# Patient Record
Sex: FEMALE | Race: WHITE | Employment: FULL TIME | URBAN - METROPOLITAN AREA
[De-identification: names, ages, dates, MRNs, and addresses within clinical notes are randomized per-mention and may not be internally consistent; named-entity substitution may affect disease eponyms.]

---

## 2018-01-29 ENCOUNTER — HOSPITAL ENCOUNTER (EMERGENCY)
Age: 63
Discharge: HOME | End: 2018-01-29
Attending: EMERGENCY MEDICINE | Admitting: EMERGENCY MEDICINE
Payer: COMMERCIAL

## 2018-01-29 ENCOUNTER — APPOINTMENT (OUTPATIENT)
Dept: GENERAL RADIOLOGY | Age: 63
End: 2018-01-29
Attending: EMERGENCY MEDICINE
Payer: COMMERCIAL

## 2018-01-29 VITALS
SYSTOLIC BLOOD PRESSURE: 118 MMHG | HEART RATE: 62 BPM | DIASTOLIC BLOOD PRESSURE: 58 MMHG | OXYGEN SATURATION: 95 % | TEMPERATURE: 97.8 F | RESPIRATION RATE: 16 BRPM

## 2018-01-29 DIAGNOSIS — R55 NEAR SYNCOPE: ICD-10-CM

## 2018-01-29 DIAGNOSIS — S82.891A CLOSED FRACTURE OF RIGHT ANKLE, INITIAL ENCOUNTER: Primary | ICD-10-CM

## 2018-01-29 LAB
ALBUMIN SERPL-MCNC: 3.8 G/DL (ref 3.5–5)
ALBUMIN/GLOB SERPL: 1 {RATIO} (ref 1.1–2.2)
ALP SERPL-CCNC: 92 U/L (ref 45–117)
ALT SERPL-CCNC: 34 U/L (ref 12–78)
ANION GAP SERPL CALC-SCNC: 7 MMOL/L (ref 5–15)
APTT PPP: 26.9 SEC (ref 22.1–32.5)
AST SERPL-CCNC: 20 U/L (ref 15–37)
BASOPHILS # BLD: 0.1 K/UL (ref 0–0.1)
BASOPHILS NFR BLD: 1 % (ref 0–1)
BILIRUB SERPL-MCNC: 0.4 MG/DL (ref 0.2–1)
BNP SERPL-MCNC: 206 PG/ML (ref 0–125)
BUN SERPL-MCNC: 13 MG/DL (ref 6–20)
BUN/CREAT SERPL: 15 (ref 12–20)
CALCIUM SERPL-MCNC: 9 MG/DL (ref 8.5–10.1)
CHLORIDE SERPL-SCNC: 104 MMOL/L (ref 97–108)
CK MB CFR SERPL CALC: 1.3 % (ref 0–2.5)
CK MB SERPL-MCNC: 2.6 NG/ML (ref 5–25)
CK SERPL-CCNC: 204 U/L (ref 26–192)
CO2 SERPL-SCNC: 28 MMOL/L (ref 21–32)
CREAT SERPL-MCNC: 0.84 MG/DL (ref 0.55–1.02)
DIFFERENTIAL METHOD BLD: NORMAL
EOSINOPHIL # BLD: 0.4 K/UL (ref 0–0.4)
EOSINOPHIL NFR BLD: 4 % (ref 0–7)
ERYTHROCYTE [DISTWIDTH] IN BLOOD BY AUTOMATED COUNT: 13.4 % (ref 11.5–14.5)
GLOBULIN SER CALC-MCNC: 3.7 G/DL (ref 2–4)
GLUCOSE SERPL-MCNC: 91 MG/DL (ref 65–100)
HCT VFR BLD AUTO: 36.3 % (ref 35–47)
HGB BLD-MCNC: 12.1 G/DL (ref 11.5–16)
IMM GRANULOCYTES # BLD: 0 K/UL (ref 0–0.04)
IMM GRANULOCYTES NFR BLD AUTO: 0 % (ref 0–0.5)
INR PPP: 1 (ref 0.9–1.1)
LYMPHOCYTES # BLD: 1.7 K/UL (ref 0.8–3.5)
LYMPHOCYTES NFR BLD: 17 % (ref 12–49)
MCH RBC QN AUTO: 30.3 PG (ref 26–34)
MCHC RBC AUTO-ENTMCNC: 33.3 G/DL (ref 30–36.5)
MCV RBC AUTO: 90.8 FL (ref 80–99)
MONOCYTES # BLD: 0.7 K/UL (ref 0–1)
MONOCYTES NFR BLD: 7 % (ref 5–13)
NEUTS SEG # BLD: 6.9 K/UL (ref 1.8–8)
NEUTS SEG NFR BLD: 70 % (ref 32–75)
NRBC # BLD: 0 K/UL (ref 0–0.01)
NRBC BLD-RTO: 0 PER 100 WBC
PLATELET # BLD AUTO: 259 K/UL (ref 150–400)
PMV BLD AUTO: 9.8 FL (ref 8.9–12.9)
POTASSIUM SERPL-SCNC: 3.7 MMOL/L (ref 3.5–5.1)
PROT SERPL-MCNC: 7.5 G/DL (ref 6.4–8.2)
PROTHROMBIN TIME: 10.4 SEC (ref 9–11.1)
RBC # BLD AUTO: 4 M/UL (ref 3.8–5.2)
SODIUM SERPL-SCNC: 139 MMOL/L (ref 136–145)
THERAPEUTIC RANGE,PTTT: NORMAL SECS (ref 58–77)
TROPONIN I SERPL-MCNC: <0.04 NG/ML
WBC # BLD AUTO: 9.9 K/UL (ref 3.6–11)

## 2018-01-29 PROCEDURE — 85730 THROMBOPLASTIN TIME PARTIAL: CPT | Performed by: EMERGENCY MEDICINE

## 2018-01-29 PROCEDURE — 83880 ASSAY OF NATRIURETIC PEPTIDE: CPT | Performed by: EMERGENCY MEDICINE

## 2018-01-29 PROCEDURE — 80053 COMPREHEN METABOLIC PANEL: CPT | Performed by: EMERGENCY MEDICINE

## 2018-01-29 PROCEDURE — 99285 EMERGENCY DEPT VISIT HI MDM: CPT

## 2018-01-29 PROCEDURE — 82553 CREATINE MB FRACTION: CPT | Performed by: EMERGENCY MEDICINE

## 2018-01-29 PROCEDURE — 75810000053 HC SPLINT APPLICATION

## 2018-01-29 PROCEDURE — 74011250636 HC RX REV CODE- 250/636: Performed by: EMERGENCY MEDICINE

## 2018-01-29 PROCEDURE — 85025 COMPLETE CBC W/AUTO DIFF WBC: CPT | Performed by: EMERGENCY MEDICINE

## 2018-01-29 PROCEDURE — 71045 X-RAY EXAM CHEST 1 VIEW: CPT

## 2018-01-29 PROCEDURE — 96360 HYDRATION IV INFUSION INIT: CPT

## 2018-01-29 PROCEDURE — 36415 COLL VENOUS BLD VENIPUNCTURE: CPT | Performed by: EMERGENCY MEDICINE

## 2018-01-29 PROCEDURE — 93005 ELECTROCARDIOGRAM TRACING: CPT

## 2018-01-29 PROCEDURE — 84484 ASSAY OF TROPONIN QUANT: CPT | Performed by: EMERGENCY MEDICINE

## 2018-01-29 PROCEDURE — 85610 PROTHROMBIN TIME: CPT | Performed by: EMERGENCY MEDICINE

## 2018-01-29 PROCEDURE — 82550 ASSAY OF CK (CPK): CPT | Performed by: EMERGENCY MEDICINE

## 2018-01-29 PROCEDURE — 73600 X-RAY EXAM OF ANKLE: CPT

## 2018-01-29 RX ORDER — TRAMADOL HYDROCHLORIDE 50 MG/1
50 TABLET ORAL
Qty: 12 TAB | Refills: 0 | Status: SHIPPED | OUTPATIENT
Start: 2018-01-29 | End: 2018-11-14

## 2018-01-29 RX ADMIN — SODIUM CHLORIDE 1000 ML: 900 INJECTION, SOLUTION INTRAVENOUS at 19:52

## 2018-01-30 LAB
ATRIAL RATE: 61 BPM
CALCULATED P AXIS, ECG09: 61 DEGREES
CALCULATED R AXIS, ECG10: 26 DEGREES
CALCULATED T AXIS, ECG11: 19 DEGREES
DIAGNOSIS, 93000: NORMAL
P-R INTERVAL, ECG05: 188 MS
Q-T INTERVAL, ECG07: 466 MS
QRS DURATION, ECG06: 86 MS
QTC CALCULATION (BEZET), ECG08: 469 MS
VENTRICULAR RATE, ECG03: 61 BPM

## 2018-01-30 NOTE — ED TRIAGE NOTES
TRIAGE NOTE:  Patient arrives by EMS with c/o dizziness. Patient reports \"fainted for a few seconds\". Per EMS /48 and BG 75.  Patient also reports dizziness with standing. Patient reports pain to right ankle.

## 2018-01-30 NOTE — DISCHARGE INSTRUCTIONS
Broken Ankle: Care Instructions  Your Care Instructions    An ankle may break (fracture) during sports, a fall, or other accidents. Fractures can range from a small, hairline crack, to a bone or bones broken into two or more pieces. Your treatment depends on how bad the break is. Your doctor may have put your ankle in a splint or cast to allow it to heal or to keep it stable until you see another doctor. It may take weeks or months for your ankle to heal. You can help your ankle heal with some care at home. You heal best when you take good care of yourself. Eat a variety of healthy foods, and don't smoke. You may have had a sedative to help you relax. You may be unsteady after having sedation. It can take a few hours for the medicine's effects to wear off. Common side effects of sedation include nausea, vomiting, and feeling sleepy or tired. The doctor has checked you carefully, but problems can develop later. If you notice any problems or new symptoms,  get medical treatment right away. Follow-up care is a key part of your treatment and safety. Be sure to make and go to all appointments, and call your doctor if you are having problems. It's also a good idea to know your test results and keep a list of the medicines you take. How can you care for yourself at home? · If the doctor gave you a sedative:  ¨ For 24 hours, don't do anything that requires attention to detail. It takes time for the medicine's effects to completely wear off. ¨ For your safety, do not drive or operate any machinery that could be dangerous. Wait until the medicine wears off and you can think clearly and react easily. · Put ice or a cold pack on your ankle for 10 to 20 minutes at a time. Try to do this every 1 to 2 hours for the next 3 days (when you are awake). Put a thin cloth between the ice and your cast or splint. Keep your cast or splint dry. · Follow the cast care instructions your doctor gives you.  If you have a splint, do not take it off unless your doctor tells you to. · Be safe with medicines. Take pain medicines exactly as directed. ¨ If the doctor gave you a prescription medicine for pain, take it as prescribed. ¨ If you are not taking a prescription pain medicine, ask your doctor if you can take an over-the-counter medicine. · Prop up your leg on pillows in the first few days after the injury. Keep the ankle higher than the level of your heart. This will help reduce swelling. · Do not put weight on your ankle unless your doctor tells you to. Use crutches to walk. · Follow instructions for exercises to keep your leg strong. · Wiggle your toes often to reduce swelling and stiffness. When should you call for help? Call 911 anytime you think you may need emergency care. For example, call if:  ? · You have chest pain, are short of breath, or you cough up blood. ? · You are very sleepy and you have trouble waking up. ?Call your doctor now or seek immediate medical care if:  ? · You have new or worse nausea or vomiting. ? · You have new or worse pain. ? · Your foot is cool or pale or changes color. ? · You have tingling, weakness, or numbness in your toes. ? · Your cast or splint feels too tight. ? · You have signs of a blood clot in your leg (called a deep vein thrombosis), such as:  ¨ Pain in your calf, back of the knee, thigh, or groin. ¨ Redness or swelling in your leg. ? Watch closely for changes in your health, and be sure to contact your doctor if:  ? · You have a problem with your splint or cast.   ? · You do not get better as expected. Where can you learn more? Go to http://wing-gabe.info/. Enter P763 in the search box to learn more about \"Broken Ankle: Care Instructions. \"  Current as of: March 21, 2017  Content Version: 11.4  © 6437-6404 [employer].  Care instructions adapted under license by [employer] (which disclaims liability or warranty for this information). If you have questions about a medical condition or this instruction, always ask your healthcare professional. [address] any warranty or liability for your use of this information. We hope that we have addressed all of your medical concerns. The examination and treatment you received in the Emergency Department were for an emergent problem and were not intended as complete care. It is important that you follow up with your healthcare provider(s) for ongoing care. If your symptoms worsen or do not improve as expected, and you are unable to reach your usual health care provider(s), you should return to the Emergency Department. Today's healthcare is undergoing tremendous change, and patient satisfaction surveys are one of the many tools to assess the quality of medical care. You may receive a survey from the [employer] organization regarding your experience in the Emergency Department. I hope that your experience has been completely positive, particularly the medical care that I provided. As such, please participate in the survey; anything less than excellent does not meet my expectations or intentions. [address] [facility] and [facility] participate in nationally recognized quality of care measures. If your blood pressure is greater than 120/80, as reported below, we urge that you seek medical care to address the potential of high blood pressure, commonly known as hypertension. Hypertension can be hereditary or can be caused by certain medical conditions, pain, stress, or \"white coat syndrome. \"       Please make an appointment with your health care provider(s) for follow up of your Emergency Department visit.        VITALS:   Patient Vitals for the past 8 hrs:   Temp Pulse Resp BP SpO2   01/29/18 1945 - 61 - 98/53 98 %   01/29/18 1903 97.8 °F (36.6 °C) 60 16 110/58 96 %          Thank you for allowing us to provide you with medical care today. We realize that you have many choices for your emergency care needs. Please choose us in the future for any continued health care needs. Margaret Villanueva MD    [facility].   Office: [phone]            Recent Results (from the past 24 hour(s))   EKG, 12 LEAD, INITIAL    Collection Time: 01/29/18  7:06 PM   Result Value Ref Range    Ventricular Rate 61 BPM    Atrial Rate 61 BPM    P-R Interval 188 ms    QRS Duration 86 ms    Q-T Interval 466 ms    QTC Calculation (Bezet) 469 ms    Calculated P Axis 61 degrees    Calculated R Axis 26 degrees    Calculated T Axis 19 degrees    Diagnosis       Normal sinus rhythm  When compared with ECG of 07-OCT-2015 10:09,  No significant change was found     CBC WITH AUTOMATED DIFF    Collection Time: 01/29/18  7:48 PM   Result Value Ref Range    WBC 9.9 3.6 - 11.0 K/uL    RBC 4.00 3.80 - 5.20 M/uL    HGB 12.1 11.5 - 16.0 g/dL    HCT 36.3 35.0 - 47.0 %    MCV 90.8 80.0 - 99.0 FL    MCH 30.3 26.0 - 34.0 PG    MCHC 33.3 30.0 - 36.5 g/dL    RDW 13.4 11.5 - 14.5 %    PLATELET 169 970 - 648 K/uL    MPV 9.8 8.9 - 12.9 FL    NRBC 0.0 0  WBC    ABSOLUTE NRBC 0.00 0.00 - 0.01 K/uL    NEUTROPHILS 70 32 - 75 %    LYMPHOCYTES 17 12 - 49 %    MONOCYTES 7 5 - 13 %    EOSINOPHILS 4 0 - 7 %    BASOPHILS 1 0 - 1 %    IMMATURE GRANULOCYTES 0 0.0 - 0.5 %    ABS. NEUTROPHILS 6.9 1.8 - 8.0 K/UL    ABS. LYMPHOCYTES 1.7 0.8 - 3.5 K/UL    ABS. MONOCYTES 0.7 0.0 - 1.0 K/UL    ABS. EOSINOPHILS 0.4 0.0 - 0.4 K/UL    ABS. BASOPHILS 0.1 0.0 - 0.1 K/UL    ABS. IMM.  GRANS. 0.0 0.00 - 0.04 K/UL    DF AUTOMATED     METABOLIC PANEL, COMPREHENSIVE    Collection Time: 01/29/18  7:48 PM   Result Value Ref Range    Sodium 139 136 - 145 mmol/L    Potassium 3.7 3.5 - 5.1 mmol/L    Chloride 104 97 - 108 mmol/L    CO2 28 21 - 32 mmol/L    Anion gap 7 5 - 15 mmol/L    Glucose 91 65 - 100 mg/dL    BUN 13 6 - 20 MG/DL    Creatinine 0.84 0.55 - 1.02 MG/DL    BUN/Creatinine ratio 15 12 - 20      GFR est AA >60 >60 ml/min/1.73m2    GFR est non-AA >60 >60 ml/min/1.73m2    Calcium 9.0 8.5 - 10.1 MG/DL    Bilirubin, total 0.4 0.2 - 1.0 MG/DL    ALT (SGPT) 34 12 - 78 U/L    AST (SGOT) 20 15 - 37 U/L    Alk. phosphatase 92 45 - 117 U/L    Protein, total 7.5 6.4 - 8.2 g/dL    Albumin 3.8 3.5 - 5.0 g/dL    Globulin 3.7 2.0 - 4.0 g/dL    A-G Ratio 1.0 (L) 1.1 - 2.2     TROPONIN I    Collection Time: 01/29/18  7:48 PM   Result Value Ref Range    Troponin-I, Qt. <0.04 <0.05 ng/mL   CK W/ REFLX CKMB    Collection Time: 01/29/18  7:48 PM   Result Value Ref Range     (H) 26 - 192 U/L   PTT    Collection Time: 01/29/18  7:48 PM   Result Value Ref Range    aPTT 26.9 22.1 - 32.5 sec    aPTT, therapeutic range     58.0 - 77.0 SECS   PROTHROMBIN TIME + INR    Collection Time: 01/29/18  7:48 PM   Result Value Ref Range    INR 1.0 0.9 - 1.1      Prothrombin time 10.4 9.0 - 11.1 sec   NT-PRO BNP    Collection Time: 01/29/18  7:48 PM   Result Value Ref Range    NT pro- (H) 0 - 125 PG/ML   CK-MB,QUANT. Collection Time: 01/29/18  7:48 PM   Result Value Ref Range    CK - MB 2.6 <3.6 NG/ML    CK-MB Index 1.3 0 - 2.5         Xr Ankle Rt Ap/lat    Result Date: 1/29/2018  EXAM:  XR ANKLE RT AP/LAT INDICATION:  Trauma. COMPARISON: None. FINDINGS: Two views of the right ankle demonstrate an acute spiral fracture of the distal fibula at the lateral malleolus without displacement or angulation. There is soft tissue swelling laterally. There is no other fracture identified and the ankle mortise is preserved. There is no other acute osseous or articular abnormality. The soft tissues are otherwise within normal limits. There is mild midfoot degenerative change and calcaneal spurring noted. IMPRESSION: Acute nondisplaced and nonangulated spiral fracture of the lateral malleolus with overlying soft tissue swelling.      Xr Chest Port    Result Date: 1/29/2018  EXAM: XR CHEST PORT INDICATION:  Dizziness with standing and single episode of syncope] arrival. COMPARISON:  None. FINDINGS: A portable AP radiograph of the chest was obtained at 20:18 hours. The patient is on a cardiac monitor. The lungs are clear. The cardiac and mediastinal contours and pulmonary vascularity are normal.  The chest wall structures and visualized upper abdomen show no acute findings with incidental note of degenerative spine and shoulder changes. IMPRESSION: No acute findings.

## 2018-01-30 NOTE — ED PROVIDER NOTES
HPI Comments: 57 y.o. female with past medical history significant for Obesity, Raynaud's Disease, GERD, and Arthritis who presents from Home via EMS with chief complaint of Evaluation after Syncopal Episode. Patient reports sudden onset of lightheadedness and loss of hearing,  prior to having a syncopal episode. Pt's family member reports the patient was lowered to the ground and became unresponsive for \"about three seconds\". Patient's family reports patient then had a second syncopal episode lasting \"for about a second\" prior to EMS arrival. Per EMS patient's BP was 100/48 and BG 75. Patient [facility] to [facility] ED alert and orientated. Pt reports onset of right ankle pain, upon being lowered to the ground. Pt states constant right ankle pain with radiation to her right foot described as \"sore\". Pt reports aggravation with palpation and with ROM of her right ankle. Pt denies weight bearing on right foot since onset of symptoms. Pt denies previous history of syncopal episodes similar to those presented today. Pt reports intermittent diarrhea at baseline. Pt denies ETOH use prior to arrival. Pt denies fever, chills, cough, congestion, shortness of breath, chest pain, abdominal pain, nausea, vomiting, difficulty with urination or dysuria. There are no other acute medical concerns at this time. PCP: Hawk Huntley MD    Note written by Chelsea Armendariz, as dictated by Marvin Valentine MD 7:28 PM      The history is provided by the patient.         Past Medical History:   Diagnosis Date    Allergic rhinitis 12/7/2009    Arthritis     Back pain 12/7/2009    Depression 12/7/2009    GERD (gastroesophageal reflux disease)     Hypercholesterolemia     Hyperlipidemia 12/7/2009    Hypothyroidism 12/7/2009    Obesity 12/7/2009    Raynaud disease        Past Surgical History:   Procedure Laterality Date    BREAST SURGERY PROCEDURE UNLISTED  2-2003    reduction    ENDOSCOPY, COLON, DIAGNOSTIC Family History:   Problem Relation Age of Onset   Randall Gallagher Cancer Mother      breast, rectal    Heart Disease Father     Asthma Father     Anesth Problems Neg Hx        Social History     Social History    Marital status:      Spouse name: N/A    Number of children: N/A    Years of education: N/A     Occupational History    Not on file. Social History Main Topics    Smoking status: Former Smoker     Packs/day: 10.00     Quit date: 10/7/1995    Smokeless tobacco: Never Used    Alcohol use 1.0 oz/week     1 Glasses of wine, 1 Shots of liquor per week      Comment: 2 DRINKS PER WEEK    Drug use: No    Sexual activity: Yes     Partners: Male     Birth control/ protection: None     Other Topics Concern    Not on file     Social History Narrative         ALLERGIES: Celebrex [celecoxib]; Codeine; and Lipitor [atorvastatin]    Review of Systems   Constitutional: Negative for chills and fever. HENT: Negative for congestion. Respiratory: Negative for cough and shortness of breath. Cardiovascular: Negative for chest pain. Gastrointestinal: Negative for abdominal pain, diarrhea, nausea and vomiting. Genitourinary: Negative for difficulty urinating and dysuria. Musculoskeletal: Positive for arthralgias. Neurological: Positive for syncope and light-headedness. All other systems reviewed and are negative. Vitals:    01/29/18 1903 01/29/18 1945   BP: 110/58 98/53   Pulse: 60 61   Resp: 16    Temp: 97.8 °F (36.6 °C)    SpO2: 96% 98%            Physical Exam   Constitutional: She is oriented to person, place, and time. She appears well-developed and well-nourished. HENT:   Head: Normocephalic and atraumatic. Nose: Nose normal.   Eyes: Conjunctivae and EOM are normal. Pupils are equal, round, and reactive to light. Neck: Normal range of motion. Neck supple. Cardiovascular: Normal rate, regular rhythm, normal heart sounds and intact distal pulses.     Pulmonary/Chest: Effort normal and breath sounds normal.   Abdominal: Soft. Bowel sounds are normal.   Musculoskeletal: Normal range of motion. She exhibits no deformity. Contusion and Mild swelling of right ankle   Neurological: She is oriented to person, place, and time. She has normal reflexes. Skin: Skin is warm and dry. Psychiatric: She has a normal mood and affect. Her behavior is normal.   Nursing note and vitals reviewed. Note written by Chelsea Castro, as dictated by Edie Esparza MD 7:28 PM    [facility]  ED Course       Procedures    ED EKG interpretation:  Rhythm: normal sinus rhythm; and regular . Rate (approx.): 61bpm; Axis: normal; ST/T wave: normal;   Note written by Chelsea Castro, as dictated by Edie Esparza MD 7:36 PM       PROGRESS NOTE:9:00 PM  Provider reviewed Xray images    PROGRESS NOTE:9:08 PM  Provider discussed imaging results, which showed acute nondisplaced and nonangulated spiral fracture of the lateral malleolus with overlying soft tissue swelling.     Will splint ankle and discharge patient with Ortho follow up

## 2018-11-14 ENCOUNTER — HOSPITAL ENCOUNTER (OUTPATIENT)
Dept: PREADMISSION TESTING | Age: 63
Discharge: HOME | End: 2018-11-14
Payer: COMMERCIAL

## 2018-11-14 VITALS
SYSTOLIC BLOOD PRESSURE: 131 MMHG | TEMPERATURE: 97.9 F | HEIGHT: 64 IN | DIASTOLIC BLOOD PRESSURE: 75 MMHG | WEIGHT: 189 LBS | RESPIRATION RATE: 16 BRPM | HEART RATE: 57 BPM | BODY MASS INDEX: 32.27 KG/M2

## 2018-11-14 LAB
ABO + RH BLD: NORMAL
BLOOD GROUP ANTIBODIES SERPL: NORMAL
SPECIMEN EXP DATE BLD: NORMAL

## 2018-11-14 PROCEDURE — 86901 BLOOD TYPING SEROLOGIC RH(D): CPT

## 2018-11-14 PROCEDURE — 36415 COLL VENOUS BLD VENIPUNCTURE: CPT

## 2018-11-14 RX ORDER — DICYCLOMINE HYDROCHLORIDE 10 MG/1
10 CAPSULE ORAL 3 TIMES DAILY
COMMUNITY

## 2018-11-14 RX ORDER — DICLOFENAC SODIUM 75 MG/1
75 TABLET, DELAYED RELEASE ORAL 2 TIMES DAILY
COMMUNITY
End: 2018-11-30

## 2018-11-14 RX ORDER — CITALOPRAM 20 MG/1
20 TABLET, FILM COATED ORAL DAILY
COMMUNITY

## 2018-11-14 NOTE — PERIOP NOTES
Patient adamantly states she took a list of ordered labs and EKG to the [facility] in [place] on 11/3/18, blood was drawn and EKG performed. States these results were faxed to Dr Cesar Jesus office. T&S and MRSA culture done today. Pt will follow up to make sure all labs and EKG were received. DO NOT EAT ANYTHING AFTER MIDNIGHT, except as instructed THE NIGHT BEFORE SURGERY. PT GIVEN OPPORTUNITY TO ASK ADDITIONAL QUESTIONS. PRE-OPERATIVE INSTRUCTIONS REVIEWED WITH PATIENT. PATIENT GIVEN 6-PACK OF CHG WIPES. INSTRUCTIONS REVIEWED ON USE OF CHG WIPES. PATIENT GIVEN SSI INFECTION FAQ SHEET. MRSA / MSSA TREATMENT INSTRUCTION SHEET GIVEN WITH AN EXPLANATION TO PATIENT THAT THEY WILL BE NOTIFIED IF TREATMENT INSTRUCTIONS NEED TO BE INITIATED. PATIENT WAS GIVEN THE OPPORTUNITY TO ASK QUESTIONS ON THE INFORMATION PROVIDED. 
 
1408: Dr Cesar Jesus office called for results to be faxed to PAT.

## 2018-11-14 NOTE — PROGRESS NOTES
Problem: Patient Education: Go to Patient Education Activity Goal: Patient/Family Education Outcome: Progressing Towards Goal 
Attended pre-op spine class with . Questions, concerns, and comments were addressed.

## 2018-11-15 LAB
BACTERIA SPEC CULT: ABNORMAL
BACTERIA SPEC CULT: ABNORMAL
SERVICE CMNT-IMP: ABNORMAL

## 2018-11-16 NOTE — PERIOP NOTES
A MESSAGE WAS LEFT C KENZIE BELTRAN'S NURSE INFORMING HER THAT THE PT IS MRSA + AND THAT ANAM NP WILL TREAT THE PT.

## 2018-11-19 PROBLEM — Z22.322 MRSA CARRIER: Status: ACTIVE | Noted: 2018-11-19

## 2018-11-19 RX ORDER — CHLORHEXIDINE GLUCONATE 4 G/100ML
60 SOLUTION TOPICAL DAILY
Qty: 420 ML | Refills: 0 | Status: SHIPPED | OUTPATIENT
Start: 2018-11-19 | End: 2018-11-30

## 2018-11-19 RX ORDER — MUPIROCIN 20 MG/G
OINTMENT TOPICAL 2 TIMES DAILY
Qty: 22 G | Refills: 0 | Status: SHIPPED | OUTPATIENT
Start: 2018-11-20 | End: 2018-11-30

## 2018-11-20 NOTE — H&P
[facility] Location: [place] [address] [address] Patient #: [id number] : 1955  / Language: [place] / Amber Antu: Coughlin Edgar Female History of Present Illness The patient is a 57year old female who presents for a Recheck of Acute lumbar back pain. The last clinic visit was 4 month(s) ago. Management changes made at the last visit include ordering test(s) (SHAWN). Symptoms include pain, decreased range of motion and numbness. The pain radiates to the left thigh and right lower leg. Onset was sudden 2 month(s) ago. The symptoms occur constantly. The patient describes this pain as moderate in severity and worsening. The patient was previously evaluated in this clinic 4 month(s) ago. Past evaluation has included x-ray of the lumbar spine and MRI of the lumbar spine. Past treatment has included back surgery. Problem List/Past Medical 
STENOSIS (724.02)   
SPRAIN/STRAIN (847.2)   
CERVICALGIA (723.1)   
SPONDYLOLISTHESIS, ACQ. (738.4)   
SCIATICA (724.3) (724.3  M54.30)   
REVIEW OF SYSTEMS: Systems were reviewed by the provider.   
Chronic low back pain (724.2  M54.5)   
Patient was referred to their primary care physician for Blood Pressure screening.   
Closed nondisplaced fracture of lateral malleolus of right fibula with routine healing, subsequent encounter (V54.19  S82.64XD)   
Weight above 97th percentile (V49.89  Z78.9)   
Nondisplaced fracture of lateral malleolus of right fibula, initial encounter for closed fracture (824.2  S82.64XA)   
FOLLOW-UP AFTER SURGERY (V67.00)   
DDD (722.52)   
S/P lumbar fusion (V45.4  Z98.1)   Allergies No Known Drug Allergies Family History Heart Disease   
Osteoporosis   
Arthritis   
Diabetes Mellitus   
Breast cancer   
Asthma   
Anemia   
 
Social History Tobacco / smoke exposure  : No 
HIV risk factors   : no Tobacco use   Former smoker. Caffeine use  : Drinks coffee, tea and soft drinks 3-4 times a day. Sun Exposure  : frequently Seat Belt Use  2: always Exercise : Bicycles and walks occasionally. Alcohol use : Drinks beer, wine and liquor 5 times per month having 1-2 drinks per occasion, rarely having more than 5 drinks per occasion. Illicit drug use  : none Medication History Medications Reconciled  
 
Past Surgical History Mammoplasty; Reduction   bilateral 
 
Diagnostic Studies History Lumbar   Date: 11/3/2015, Results: AP and lateral films of the lumbar spine reveal a stable posterior lumbar fusion at L4-5. Instrumentation appears intact and without hardware difficulty. MRI, Spine   Date: 2/4/2015, Results: Review of the MRI demonstrates severe stenosis at L4-5 due to the anterolisthesis. Is also moderate stenosis at L5-S1 due to disc space collapse and foraminal narrowing. There are no subluxations fractures or lytic lesions. Lumbar Spine X-ray   Date: 1/12/2016, Results: AP and lateral x-rays of the lumbar spine shows stable fusion at L4-5. Instrumentation is in good position. Good graft incorporation is noted. Lumbar Spine X Ray   Date: 4/13/2016, Results:AP and lateral films of the lumbar spine reveals a stable posterior lumbar fusion with interbody fusion at L4-5. Instrumentation appears intact and without hardware difficulty. Lumbar Spine   Date: 12/9/2015, Results: AP and lateral films of the lumbar spine reveals a stable posterior lumbar fusion with interbody fusion at L4-5. Instrumentation appears intact and without hardware difficulty. Cervical Spine X-ray   Date: 7/31/2015, Results: Review of the cervical x-rays demonstrate multilevel cervical spondylosis. There are no fractures or lytic lesions. There are no subluxations. Lumbar Spine X-ray   Date: 10/19/2016, Results: AP and lateral xrays of the lumbar spine show a stable fusion at L4-5. instrumentation is in good position. good bone graft incorporation is noted.  
MRI Lumbar Spine   Date: 5/2/2018, Results: MRI shows a moderate spondylosis at L3-4. Stable decompression and fusion at L4-5. Discoloration at L3-4 causing severe foraminal stenosis bilaterally. Other Problems Hypercholesterolemia   
Arthritis   Thyroid Disease   
Unspecified Diagnosis   
Treatment options were discussed with the patient in full.   
 
 
Physical Exam 
Neurologic Sensory Light Touch - Intact - Globally. Overall Assessment of Muscle Strength and Tone reveals Lower Extremities - Right Iliopsoas - 5/5. Left Iliopsoas - 4-/5. Right Tibialis Anterior - 4/5. Left Tibialis Anterior - 5/5. Right Gastroc-Soleus - 5/5. Left Gastroc-Soleus - 5/5. Right EHL - 5/5. Left EHL - 5/5. General Assessment of Reflexes Right Ankle - Clonus is not present. Left Ankle - Clonus is not present. Reflexes (Dermatomes) 2/2 Normal - Left Achilles (L5-S2), Left Knee (L2-4), Right Achilles (L5-S2) and Right Knee (L2-4). Musculoskeletal 
Global Assessment Examination of related systems reveals - well-developed, well-nourished, in no acute distress, alert and oriented x 3. Gait and Station - normal gait and station and normal posture. Right Lower Extremity - normal strength and tone, normal range of motion without pain and no instability, subluxation or laxity. Left Lower Extremity - normal strength and tone, normal range of motion without pain and no instability, subluxation or laxity. Spine/Ribs/Pelvis Cervical Spine - Examination of the cervical spine reveals - no tenderness to palpation, no pain, no swelling, edema or erythema, normal cervical spine movements and normal sensation. Thoracic (Dorsal) Spine - Examination of the thoracic spine reveals - no tenderness over thoracic vertebrae, no pain, normal sensation and normal thoracic spine movements. Lumbosacral Spine - Examination of the lumbosacral spine reveals - no known fractures or deformities. Inspection and Palpation - Tenderness - moderate.  Assessment of pain reveals the following findings - The pain is characterized as - moderate. Location - pain refers to lower back bilaterally. ROJM - Trunk Extension - 15 degrees. Lumbar Spine Flexion - . Lumbosacral Spine - Functional Testing - Babinski Test negative, Prone Knee Bending Test negative, Slump Test negative, Straight Leg Raising Test negative. Assessment & PlanS/P lumbar fusion (V45.4  Z98.1) Impression: She continues to have pain from the junction stenosis at L3-4. She has severe stenosis there. She's had 3 injections without long-term relief. Ultimately I think she candidate for revision laminectomy at L3-4 she also needs an extension of fusion to L3. She has a mild flat back deformity with a pelvic incidence T lumbar lordosis mismatch. She may need a posterior osteotomy at L3-4 with a hyperlordotic graft to restore some lumbar lordosis. The risks and benefits were discussed at length with the patient and the patient has elected to proceed. Indications for surgery include failed conservative treatment. Alternative treatments, risks and the perioperative course were discussed with the patient. All questions were answered. The risks and benefits of the procedure were explained. Benefits include definitive diagnosis, relief of pain, elimination of deformity and improved function. Risks of surgery including bleeding, infection, weakness, numbness, CSF leak, failure to improve symptoms, exacerbation of medical co-morbidities and even death were discussed with the patient. SPONDYLOLISTHESIS, ACQ. (738.4  M43.10) Lumbar stenosis with neurogenic claudication (724.03  M48.062) Treatment options were discussed with the patient in full.(V65.49) Current Plans Pt Education - How to access health information online: discussed with patient and provided information. Presurgical planning was preformed with the patient today Surgery to be scheduled Procedure: Lumbar Laminectomy Revison L3-4 with L3-5 fusion Signed by Fabiola Quintanilla MD

## 2018-11-26 ENCOUNTER — ANESTHESIA EVENT (OUTPATIENT)
Dept: SURGERY | Age: 63
DRG: 455 | End: 2018-11-26
Payer: COMMERCIAL

## 2018-11-27 ENCOUNTER — HOSPITAL ENCOUNTER (INPATIENT)
Age: 63
LOS: 3 days | Discharge: HOME HEALTH | DRG: 455 | End: 2018-11-30
Attending: ORTHOPAEDIC SURGERY | Admitting: ORTHOPAEDIC SURGERY
Payer: COMMERCIAL

## 2018-11-27 ENCOUNTER — ANESTHESIA (OUTPATIENT)
Dept: SURGERY | Age: 63
DRG: 455 | End: 2018-11-27
Payer: COMMERCIAL

## 2018-11-27 ENCOUNTER — APPOINTMENT (OUTPATIENT)
Dept: GENERAL RADIOLOGY | Age: 63
DRG: 455 | End: 2018-11-27
Attending: ORTHOPAEDIC SURGERY
Payer: COMMERCIAL

## 2018-11-27 DIAGNOSIS — M48.062 SPINAL STENOSIS OF LUMBAR REGION WITH NEUROGENIC CLAUDICATION: Primary | ICD-10-CM

## 2018-11-27 PROBLEM — M48.061 SPINAL STENOSIS, LUMBAR: Status: ACTIVE | Noted: 2018-11-27

## 2018-11-27 PROCEDURE — C1713 ANCHOR/SCREW BN/BN,TIS/BN: HCPCS | Performed by: ORTHOPAEDIC SURGERY

## 2018-11-27 PROCEDURE — 77030014647 HC SEAL FBRN TISSL BAXT -D: Performed by: ORTHOPAEDIC SURGERY

## 2018-11-27 PROCEDURE — 65270000029 HC RM PRIVATE

## 2018-11-27 PROCEDURE — 77030004391 HC BUR FLUT MEDT -C: Performed by: ORTHOPAEDIC SURGERY

## 2018-11-27 PROCEDURE — 77030031139 HC SUT VCRL2 J&J -A: Performed by: ORTHOPAEDIC SURGERY

## 2018-11-27 PROCEDURE — 76060000037 HC ANESTHESIA 3 TO 3.5 HR: Performed by: ORTHOPAEDIC SURGERY

## 2018-11-27 PROCEDURE — 74011250636 HC RX REV CODE- 250/636

## 2018-11-27 PROCEDURE — 74011250636 HC RX REV CODE- 250/636: Performed by: ANESTHESIOLOGY

## 2018-11-27 PROCEDURE — 77030038600 HC TU BPLR IRR DISP STRY -B: Performed by: ORTHOPAEDIC SURGERY

## 2018-11-27 PROCEDURE — 77030013079 HC BLNKT BAIR HGGR 3M -A: Performed by: NURSE ANESTHETIST, CERTIFIED REGISTERED

## 2018-11-27 PROCEDURE — 76010000172 HC OR TIME 2.5 TO 3 HR INTENSV-TIER 1: Performed by: ORTHOPAEDIC SURGERY

## 2018-11-27 PROCEDURE — 77030012406 HC DRN WND PENRS BARD -A: Performed by: ORTHOPAEDIC SURGERY

## 2018-11-27 PROCEDURE — 77030008684 HC TU ET CUF COVD -B: Performed by: NURSE ANESTHETIST, CERTIFIED REGISTERED

## 2018-11-27 PROCEDURE — 77030022373 HC SPCR SPN STAXX SPWV -K1: Performed by: ORTHOPAEDIC SURGERY

## 2018-11-27 PROCEDURE — 74011250637 HC RX REV CODE- 250/637: Performed by: PHYSICIAN ASSISTANT

## 2018-11-27 PROCEDURE — 74011000250 HC RX REV CODE- 250

## 2018-11-27 PROCEDURE — 77030034850: Performed by: ORTHOPAEDIC SURGERY

## 2018-11-27 PROCEDURE — 8E0WXBF COMPUTER ASSISTED PROCEDURE OF TRUNK REGION, WITH FLUOROSCOPY: ICD-10-PCS | Performed by: ORTHOPAEDIC SURGERY

## 2018-11-27 PROCEDURE — 77030029099 HC BN WAX SSPC -A: Performed by: ORTHOPAEDIC SURGERY

## 2018-11-27 PROCEDURE — 74011000250 HC RX REV CODE- 250: Performed by: PHYSICIAN ASSISTANT

## 2018-11-27 PROCEDURE — 77030032490 HC SLV COMPR SCD KNE COVD -B: Performed by: ORTHOPAEDIC SURGERY

## 2018-11-27 PROCEDURE — 74011000258 HC RX REV CODE- 258

## 2018-11-27 PROCEDURE — 74011250636 HC RX REV CODE- 250/636: Performed by: ORTHOPAEDIC SURGERY

## 2018-11-27 PROCEDURE — 77030037713 HC CLOSR DEV INCIS ZIP STRY -B: Performed by: ORTHOPAEDIC SURGERY

## 2018-11-27 PROCEDURE — 76210000017 HC OR PH I REC 1.5 TO 2 HR: Performed by: ORTHOPAEDIC SURGERY

## 2018-11-27 PROCEDURE — 74011000250 HC RX REV CODE- 250: Performed by: ORTHOPAEDIC SURGERY

## 2018-11-27 PROCEDURE — 77030026438 HC STYL ET INTUB CARD -A: Performed by: NURSE ANESTHETIST, CERTIFIED REGISTERED

## 2018-11-27 PROCEDURE — 0SG1071 FUSION OF 2 OR MORE LUMBAR VERTEBRAL JOINTS WITH AUTOLOGOUS TISSUE SUBSTITUTE, POSTERIOR APPROACH, POSTERIOR COLUMN, OPEN APPROACH: ICD-10-PCS | Performed by: ORTHOPAEDIC SURGERY

## 2018-11-27 PROCEDURE — 77030034094 HC GRFT BN SUB ELITE TRNTY MUSC -I1: Performed by: ORTHOPAEDIC SURGERY

## 2018-11-27 PROCEDURE — 74011250636 HC RX REV CODE- 250/636: Performed by: PHYSICIAN ASSISTANT

## 2018-11-27 PROCEDURE — 0ST20ZZ RESECTION OF LUMBAR VERTEBRAL DISC, OPEN APPROACH: ICD-10-PCS | Performed by: ORTHOPAEDIC SURGERY

## 2018-11-27 PROCEDURE — 72100 X-RAY EXAM L-S SPINE 2/3 VWS: CPT

## 2018-11-27 PROCEDURE — 77030012893

## 2018-11-27 PROCEDURE — 76000 FLUOROSCOPY <1 HR PHYS/QHP: CPT

## 2018-11-27 PROCEDURE — 77030018836 HC SOL IRR NACL ICUM -A: Performed by: ORTHOPAEDIC SURGERY

## 2018-11-27 PROCEDURE — 0SG10AJ FUSION OF 2 OR MORE LUMBAR VERTEBRAL JOINTS WITH INTERBODY FUSION DEVICE, POSTERIOR APPROACH, ANTERIOR COLUMN, OPEN APPROACH: ICD-10-PCS | Performed by: ORTHOPAEDIC SURGERY

## 2018-11-27 PROCEDURE — 77030020782 HC GWN BAIR PAWS FLX 3M -B

## 2018-11-27 DEVICE — SET SCREW 5540030 5.5 TI NS BRK OFF
Type: IMPLANTABLE DEVICE | Site: SPINE LUMBAR | Status: FUNCTIONAL
Brand: CD HORIZON® SPINAL SYSTEM

## 2018-11-27 DEVICE — GRAFT BNE SUB L CANC FRZN MORSELIZED W/ VIABLE CELL TRINITY: Type: IMPLANTABLE DEVICE | Site: SPINE LUMBAR | Status: FUNCTIONAL

## 2018-11-27 DEVICE — GRAFT BNE SUB 7.5GM PTTY SYN SIGNAFUSE: Type: IMPLANTABLE DEVICE | Site: SPINE LUMBAR | Status: FUNCTIONAL

## 2018-11-27 DEVICE — CARTRIDGE LORDOTIC EXPANDABLE IMPLANT TI 25X9X8MM 15DEG
Type: IMPLANTABLE DEVICE | Site: SPINE LUMBAR | Status: FUNCTIONAL
Brand: STAXX® XD SYSTEM

## 2018-11-27 DEVICE — GRAFT BNE SUB M CANC FRZN MORSELIZED W/ VIABLE CELL TRINITY: Type: IMPLANTABLE DEVICE | Site: SPINE LUMBAR | Status: FUNCTIONAL

## 2018-11-27 RX ORDER — SUCCINYLCHOLINE CHLORIDE 20 MG/ML
INJECTION INTRAMUSCULAR; INTRAVENOUS AS NEEDED
Status: DISCONTINUED | OUTPATIENT
Start: 2018-11-27 | End: 2018-11-27 | Stop reason: HOSPADM

## 2018-11-27 RX ORDER — FENTANYL CITRATE 50 UG/ML
25 INJECTION, SOLUTION INTRAMUSCULAR; INTRAVENOUS
Status: COMPLETED | OUTPATIENT
Start: 2018-11-27 | End: 2018-11-27

## 2018-11-27 RX ORDER — MORPHINE SULFATE IN 0.9 % NACL 150MG/30ML
PATIENT CONTROLLED ANALGESIA SYRINGE INTRAVENOUS
Status: DISCONTINUED | OUTPATIENT
Start: 2018-11-27 | End: 2018-11-30 | Stop reason: HOSPADM

## 2018-11-27 RX ORDER — VANCOMYCIN HYDROCHLORIDE 1 G/20ML
INJECTION, POWDER, LYOPHILIZED, FOR SOLUTION INTRAVENOUS AS NEEDED
Status: DISCONTINUED | OUTPATIENT
Start: 2018-11-27 | End: 2018-11-27 | Stop reason: HOSPADM

## 2018-11-27 RX ORDER — OXYCODONE HYDROCHLORIDE 5 MG/1
5 TABLET ORAL
Status: DISCONTINUED | OUTPATIENT
Start: 2018-11-27 | End: 2018-11-30 | Stop reason: HOSPADM

## 2018-11-27 RX ORDER — SODIUM CHLORIDE 0.9 % (FLUSH) 0.9 %
5-10 SYRINGE (ML) INJECTION AS NEEDED
Status: DISCONTINUED | OUTPATIENT
Start: 2018-11-27 | End: 2018-11-27 | Stop reason: HOSPADM

## 2018-11-27 RX ORDER — CITALOPRAM 20 MG/1
20 TABLET, FILM COATED ORAL DAILY
Status: DISCONTINUED | OUTPATIENT
Start: 2018-11-28 | End: 2018-11-30 | Stop reason: HOSPADM

## 2018-11-27 RX ORDER — DEXAMETHASONE SODIUM PHOSPHATE 4 MG/ML
INJECTION, SOLUTION INTRA-ARTICULAR; INTRALESIONAL; INTRAMUSCULAR; INTRAVENOUS; SOFT TISSUE AS NEEDED
Status: DISCONTINUED | OUTPATIENT
Start: 2018-11-27 | End: 2018-11-27 | Stop reason: HOSPADM

## 2018-11-27 RX ORDER — HYDROMORPHONE HYDROCHLORIDE 2 MG/ML
INJECTION, SOLUTION INTRAMUSCULAR; INTRAVENOUS; SUBCUTANEOUS AS NEEDED
Status: DISCONTINUED | OUTPATIENT
Start: 2018-11-27 | End: 2018-11-27 | Stop reason: HOSPADM

## 2018-11-27 RX ORDER — DIPHENHYDRAMINE HCL 25 MG
25 CAPSULE ORAL
Status: DISCONTINUED | OUTPATIENT
Start: 2018-11-27 | End: 2018-11-30 | Stop reason: HOSPADM

## 2018-11-27 RX ORDER — MIDAZOLAM HYDROCHLORIDE 1 MG/ML
1 INJECTION, SOLUTION INTRAMUSCULAR; INTRAVENOUS AS NEEDED
Status: DISCONTINUED | OUTPATIENT
Start: 2018-11-27 | End: 2018-11-27 | Stop reason: HOSPADM

## 2018-11-27 RX ORDER — SODIUM CHLORIDE 9 MG/ML
125 INJECTION, SOLUTION INTRAVENOUS CONTINUOUS
Status: DISPENSED | OUTPATIENT
Start: 2018-11-27 | End: 2018-11-28

## 2018-11-27 RX ORDER — SODIUM CHLORIDE 0.9 % (FLUSH) 0.9 %
5-10 SYRINGE (ML) INJECTION AS NEEDED
Status: DISCONTINUED | OUTPATIENT
Start: 2018-11-27 | End: 2018-11-30 | Stop reason: HOSPADM

## 2018-11-27 RX ORDER — HYDROMORPHONE HYDROCHLORIDE 2 MG/ML
0.2 INJECTION, SOLUTION INTRAMUSCULAR; INTRAVENOUS; SUBCUTANEOUS
Status: DISCONTINUED | OUTPATIENT
Start: 2018-11-27 | End: 2018-11-27 | Stop reason: HOSPADM

## 2018-11-27 RX ORDER — FACIAL-BODY WIPES
10 EACH TOPICAL DAILY PRN
Status: DISCONTINUED | OUTPATIENT
Start: 2018-11-29 | End: 2018-11-30 | Stop reason: HOSPADM

## 2018-11-27 RX ORDER — ONDANSETRON 2 MG/ML
INJECTION INTRAMUSCULAR; INTRAVENOUS AS NEEDED
Status: DISCONTINUED | OUTPATIENT
Start: 2018-11-27 | End: 2018-11-27 | Stop reason: HOSPADM

## 2018-11-27 RX ORDER — NALOXONE HYDROCHLORIDE 0.4 MG/ML
0.4 INJECTION, SOLUTION INTRAMUSCULAR; INTRAVENOUS; SUBCUTANEOUS AS NEEDED
Status: DISCONTINUED | OUTPATIENT
Start: 2018-11-27 | End: 2018-11-30 | Stop reason: HOSPADM

## 2018-11-27 RX ORDER — OXYCODONE HYDROCHLORIDE 5 MG/1
10 TABLET ORAL
Status: DISCONTINUED | OUTPATIENT
Start: 2018-11-27 | End: 2018-11-30 | Stop reason: HOSPADM

## 2018-11-27 RX ORDER — MORPHINE SULFATE 2 MG/ML
2 INJECTION, SOLUTION INTRAMUSCULAR; INTRAVENOUS
Status: DISCONTINUED | OUTPATIENT
Start: 2018-11-27 | End: 2018-11-30 | Stop reason: HOSPADM

## 2018-11-27 RX ORDER — SODIUM CHLORIDE 0.9 % (FLUSH) 0.9 %
5-10 SYRINGE (ML) INJECTION EVERY 8 HOURS
Status: DISCONTINUED | OUTPATIENT
Start: 2018-11-28 | End: 2018-11-30 | Stop reason: HOSPADM

## 2018-11-27 RX ORDER — POLYETHYLENE GLYCOL 3350 17 G/17G
17 POWDER, FOR SOLUTION ORAL DAILY
Status: DISCONTINUED | OUTPATIENT
Start: 2018-11-28 | End: 2018-11-30 | Stop reason: HOSPADM

## 2018-11-27 RX ORDER — VANCOMYCIN/0.9 % SOD CHLORIDE 1.5G/250ML
1500 PLASTIC BAG, INJECTION (ML) INTRAVENOUS ONCE
Status: COMPLETED | OUTPATIENT
Start: 2018-11-27 | End: 2018-11-27

## 2018-11-27 RX ORDER — DEXTROSE, SODIUM CHLORIDE, SODIUM LACTATE, POTASSIUM CHLORIDE, AND CALCIUM CHLORIDE 5; .6; .31; .03; .02 G/100ML; G/100ML; G/100ML; G/100ML; G/100ML
125 INJECTION, SOLUTION INTRAVENOUS CONTINUOUS
Status: DISCONTINUED | OUTPATIENT
Start: 2018-11-27 | End: 2018-11-27 | Stop reason: HOSPADM

## 2018-11-27 RX ORDER — SODIUM CHLORIDE, SODIUM LACTATE, POTASSIUM CHLORIDE, CALCIUM CHLORIDE 600; 310; 30; 20 MG/100ML; MG/100ML; MG/100ML; MG/100ML
125 INJECTION, SOLUTION INTRAVENOUS CONTINUOUS
Status: DISCONTINUED | OUTPATIENT
Start: 2018-11-27 | End: 2018-11-27 | Stop reason: HOSPADM

## 2018-11-27 RX ORDER — ACETAMINOPHEN 500 MG
1000 TABLET ORAL EVERY 6 HOURS
Status: DISCONTINUED | OUTPATIENT
Start: 2018-11-27 | End: 2018-11-30 | Stop reason: HOSPADM

## 2018-11-27 RX ORDER — ROCURONIUM BROMIDE 10 MG/ML
INJECTION, SOLUTION INTRAVENOUS AS NEEDED
Status: DISCONTINUED | OUTPATIENT
Start: 2018-11-27 | End: 2018-11-27 | Stop reason: HOSPADM

## 2018-11-27 RX ORDER — LEVOTHYROXINE SODIUM 50 UG/1
50 TABLET ORAL
Status: DISCONTINUED | OUTPATIENT
Start: 2018-11-28 | End: 2018-11-30 | Stop reason: HOSPADM

## 2018-11-27 RX ORDER — KETOROLAC TROMETHAMINE 30 MG/ML
INJECTION, SOLUTION INTRAMUSCULAR; INTRAVENOUS
Status: DISPENSED
Start: 2018-11-27 | End: 2018-11-28

## 2018-11-27 RX ORDER — GLYCOPYRROLATE 0.2 MG/ML
INJECTION INTRAMUSCULAR; INTRAVENOUS AS NEEDED
Status: DISCONTINUED | OUTPATIENT
Start: 2018-11-27 | End: 2018-11-27 | Stop reason: HOSPADM

## 2018-11-27 RX ORDER — MORPHINE SULFATE 10 MG/ML
2 INJECTION, SOLUTION INTRAMUSCULAR; INTRAVENOUS
Status: DISCONTINUED | OUTPATIENT
Start: 2018-11-27 | End: 2018-11-27 | Stop reason: HOSPADM

## 2018-11-27 RX ORDER — HYDROMORPHONE HYDROCHLORIDE 2 MG/ML
INJECTION, SOLUTION INTRAMUSCULAR; INTRAVENOUS; SUBCUTANEOUS
Status: COMPLETED
Start: 2018-11-27 | End: 2018-11-27

## 2018-11-27 RX ORDER — LIDOCAINE HYDROCHLORIDE 20 MG/ML
INJECTION, SOLUTION EPIDURAL; INFILTRATION; INTRACAUDAL; PERINEURAL AS NEEDED
Status: DISCONTINUED | OUTPATIENT
Start: 2018-11-27 | End: 2018-11-27 | Stop reason: HOSPADM

## 2018-11-27 RX ORDER — GABAPENTIN 300 MG/1
600 CAPSULE ORAL
Status: DISCONTINUED | OUTPATIENT
Start: 2018-11-27 | End: 2018-11-30 | Stop reason: HOSPADM

## 2018-11-27 RX ORDER — HYDROMORPHONE HYDROCHLORIDE 2 MG/ML
0.2 INJECTION, SOLUTION INTRAMUSCULAR; INTRAVENOUS; SUBCUTANEOUS
Status: DISCONTINUED | OUTPATIENT
Start: 2018-11-27 | End: 2018-11-27 | Stop reason: CLARIF

## 2018-11-27 RX ORDER — DICYCLOMINE HYDROCHLORIDE 10 MG/1
10 CAPSULE ORAL 3 TIMES DAILY
Status: DISCONTINUED | OUTPATIENT
Start: 2018-11-27 | End: 2018-11-30

## 2018-11-27 RX ORDER — CYCLOBENZAPRINE HCL 10 MG
10 TABLET ORAL
Status: DISCONTINUED | OUTPATIENT
Start: 2018-11-27 | End: 2018-11-30 | Stop reason: HOSPADM

## 2018-11-27 RX ORDER — PROPOFOL 10 MG/ML
INJECTION, EMULSION INTRAVENOUS AS NEEDED
Status: DISCONTINUED | OUTPATIENT
Start: 2018-11-27 | End: 2018-11-27 | Stop reason: HOSPADM

## 2018-11-27 RX ORDER — PANTOPRAZOLE SODIUM 40 MG/1
40 TABLET, DELAYED RELEASE ORAL
Status: DISCONTINUED | OUTPATIENT
Start: 2018-11-28 | End: 2018-11-30 | Stop reason: HOSPADM

## 2018-11-27 RX ORDER — ONDANSETRON 2 MG/ML
4 INJECTION INTRAMUSCULAR; INTRAVENOUS AS NEEDED
Status: DISCONTINUED | OUTPATIENT
Start: 2018-11-27 | End: 2018-11-27 | Stop reason: HOSPADM

## 2018-11-27 RX ORDER — HYDROMORPHONE HYDROCHLORIDE 2 MG/ML
0.2 INJECTION, SOLUTION INTRAMUSCULAR; INTRAVENOUS; SUBCUTANEOUS
Status: DISCONTINUED | OUTPATIENT
Start: 2018-11-27 | End: 2018-11-27

## 2018-11-27 RX ORDER — ESOMEPRAZOLE MAGNESIUM 40 MG/1
40 CAPSULE, DELAYED RELEASE ORAL DAILY
Status: DISCONTINUED | OUTPATIENT
Start: 2018-11-28 | End: 2018-11-27 | Stop reason: CLARIF

## 2018-11-27 RX ORDER — NEOSTIGMINE METHYLSULFATE 1 MG/ML
INJECTION INTRAVENOUS AS NEEDED
Status: DISCONTINUED | OUTPATIENT
Start: 2018-11-27 | End: 2018-11-27 | Stop reason: HOSPADM

## 2018-11-27 RX ORDER — FENTANYL CITRATE 50 UG/ML
INJECTION, SOLUTION INTRAMUSCULAR; INTRAVENOUS
Status: COMPLETED
Start: 2018-11-27 | End: 2018-11-27

## 2018-11-27 RX ORDER — CETIRIZINE HCL 10 MG
10 TABLET ORAL
Status: DISCONTINUED | OUTPATIENT
Start: 2018-11-27 | End: 2018-11-30 | Stop reason: HOSPADM

## 2018-11-27 RX ORDER — SODIUM CHLORIDE 0.9 % (FLUSH) 0.9 %
5-10 SYRINGE (ML) INJECTION EVERY 8 HOURS
Status: DISCONTINUED | OUTPATIENT
Start: 2018-11-27 | End: 2018-11-27 | Stop reason: HOSPADM

## 2018-11-27 RX ORDER — AMOXICILLIN 250 MG
1 CAPSULE ORAL 2 TIMES DAILY
Status: DISCONTINUED | OUTPATIENT
Start: 2018-11-27 | End: 2018-11-30 | Stop reason: HOSPADM

## 2018-11-27 RX ORDER — OXYCODONE HYDROCHLORIDE 5 MG/1
5 TABLET ORAL AS NEEDED
Status: DISCONTINUED | OUTPATIENT
Start: 2018-11-27 | End: 2018-11-27 | Stop reason: HOSPADM

## 2018-11-27 RX ORDER — FENTANYL CITRATE 50 UG/ML
50 INJECTION, SOLUTION INTRAMUSCULAR; INTRAVENOUS AS NEEDED
Status: DISCONTINUED | OUTPATIENT
Start: 2018-11-27 | End: 2018-11-27 | Stop reason: HOSPADM

## 2018-11-27 RX ORDER — LIDOCAINE HYDROCHLORIDE 10 MG/ML
0.5 INJECTION, SOLUTION EPIDURAL; INFILTRATION; INTRACAUDAL; PERINEURAL AS NEEDED
Status: DISCONTINUED | OUTPATIENT
Start: 2018-11-27 | End: 2018-11-27 | Stop reason: HOSPADM

## 2018-11-27 RX ORDER — ROSUVASTATIN CALCIUM 10 MG/1
5 TABLET, COATED ORAL
Status: DISCONTINUED | OUTPATIENT
Start: 2018-11-27 | End: 2018-11-30 | Stop reason: HOSPADM

## 2018-11-27 RX ORDER — VANCOMYCIN/0.9 % SOD CHLORIDE 1.5G/250ML
1500 PLASTIC BAG, INJECTION (ML) INTRAVENOUS EVERY 12 HOURS
Status: COMPLETED | OUTPATIENT
Start: 2018-11-28 | End: 2018-11-28

## 2018-11-27 RX ORDER — KETOROLAC TROMETHAMINE 30 MG/ML
15 INJECTION, SOLUTION INTRAMUSCULAR; INTRAVENOUS EVERY 6 HOURS
Status: COMPLETED | OUTPATIENT
Start: 2018-11-27 | End: 2018-11-28

## 2018-11-27 RX ORDER — MIDAZOLAM HYDROCHLORIDE 1 MG/ML
INJECTION, SOLUTION INTRAMUSCULAR; INTRAVENOUS AS NEEDED
Status: DISCONTINUED | OUTPATIENT
Start: 2018-11-27 | End: 2018-11-27 | Stop reason: HOSPADM

## 2018-11-27 RX ORDER — MIDAZOLAM HYDROCHLORIDE 1 MG/ML
1 INJECTION, SOLUTION INTRAMUSCULAR; INTRAVENOUS
Status: DISCONTINUED | OUTPATIENT
Start: 2018-11-27 | End: 2018-11-27 | Stop reason: HOSPADM

## 2018-11-27 RX ORDER — FENTANYL CITRATE 50 UG/ML
INJECTION, SOLUTION INTRAMUSCULAR; INTRAVENOUS AS NEEDED
Status: DISCONTINUED | OUTPATIENT
Start: 2018-11-27 | End: 2018-11-27 | Stop reason: HOSPADM

## 2018-11-27 RX ORDER — ONDANSETRON 2 MG/ML
4 INJECTION INTRAMUSCULAR; INTRAVENOUS
Status: ACTIVE | OUTPATIENT
Start: 2018-11-27 | End: 2018-11-28

## 2018-11-27 RX ADMIN — ACETAMINOPHEN 1000 MG: 500 TABLET ORAL at 19:33

## 2018-11-27 RX ADMIN — FENTANYL CITRATE 25 MCG: 50 INJECTION, SOLUTION INTRAMUSCULAR; INTRAVENOUS at 16:48

## 2018-11-27 RX ADMIN — FENTANYL CITRATE 100 MCG: 50 INJECTION, SOLUTION INTRAMUSCULAR; INTRAVENOUS at 13:39

## 2018-11-27 RX ADMIN — FENTANYL CITRATE 25 MCG: 50 INJECTION, SOLUTION INTRAMUSCULAR; INTRAVENOUS at 16:43

## 2018-11-27 RX ADMIN — ROCURONIUM BROMIDE 10 MG: 10 INJECTION, SOLUTION INTRAVENOUS at 13:39

## 2018-11-27 RX ADMIN — SUCCINYLCHOLINE CHLORIDE 120 MG: 20 INJECTION INTRAMUSCULAR; INTRAVENOUS at 13:40

## 2018-11-27 RX ADMIN — LIDOCAINE HYDROCHLORIDE 100 MG: 20 INJECTION, SOLUTION EPIDURAL; INFILTRATION; INTRACAUDAL; PERINEURAL at 13:39

## 2018-11-27 RX ADMIN — HYDROMORPHONE HYDROCHLORIDE 0.2 MG: 2 INJECTION INTRAMUSCULAR; INTRAVENOUS; SUBCUTANEOUS at 17:05

## 2018-11-27 RX ADMIN — ONDANSETRON 4 MG: 2 INJECTION INTRAMUSCULAR; INTRAVENOUS at 13:40

## 2018-11-27 RX ADMIN — HYDROMORPHONE HYDROCHLORIDE 2 MG: 2 INJECTION, SOLUTION INTRAMUSCULAR; INTRAVENOUS; SUBCUTANEOUS at 13:47

## 2018-11-27 RX ADMIN — FENTANYL CITRATE 25 MCG: 50 INJECTION, SOLUTION INTRAMUSCULAR; INTRAVENOUS at 16:54

## 2018-11-27 RX ADMIN — FENTANYL CITRATE 25 MCG: 50 INJECTION INTRAMUSCULAR; INTRAVENOUS at 16:43

## 2018-11-27 RX ADMIN — HYDROMORPHONE HYDROCHLORIDE 0.2 MG: 2 INJECTION, SOLUTION INTRAMUSCULAR; INTRAVENOUS; SUBCUTANEOUS at 17:45

## 2018-11-27 RX ADMIN — CETIRIZINE HYDROCHLORIDE 10 MG: 10 TABLET, FILM COATED ORAL at 22:18

## 2018-11-27 RX ADMIN — MIDAZOLAM HYDROCHLORIDE 2 MG: 1 INJECTION, SOLUTION INTRAMUSCULAR; INTRAVENOUS at 13:33

## 2018-11-27 RX ADMIN — HYDROMORPHONE HYDROCHLORIDE 0.2 MG: 2 INJECTION, SOLUTION INTRAMUSCULAR; INTRAVENOUS; SUBCUTANEOUS at 17:16

## 2018-11-27 RX ADMIN — GABAPENTIN 600 MG: 300 CAPSULE ORAL at 22:19

## 2018-11-27 RX ADMIN — HYDROMORPHONE HYDROCHLORIDE 0.2 MG: 2 INJECTION, SOLUTION INTRAMUSCULAR; INTRAVENOUS; SUBCUTANEOUS at 17:05

## 2018-11-27 RX ADMIN — DEXAMETHASONE SODIUM PHOSPHATE 8 MG: 4 INJECTION, SOLUTION INTRA-ARTICULAR; INTRALESIONAL; INTRAMUSCULAR; INTRAVENOUS; SOFT TISSUE at 13:40

## 2018-11-27 RX ADMIN — NEOSTIGMINE METHYLSULFATE 3 MG: 1 INJECTION INTRAVENOUS at 15:10

## 2018-11-27 RX ADMIN — SODIUM CHLORIDE, POTASSIUM CHLORIDE, SODIUM LACTATE AND CALCIUM CHLORIDE: 600; 310; 30; 20 INJECTION, SOLUTION INTRAVENOUS at 13:24

## 2018-11-27 RX ADMIN — SODIUM CHLORIDE 125 ML/HR: 900 INJECTION, SOLUTION INTRAVENOUS at 19:35

## 2018-11-27 RX ADMIN — ROCURONIUM BROMIDE 15 MG: 10 INJECTION, SOLUTION INTRAVENOUS at 14:31

## 2018-11-27 RX ADMIN — HYDROMORPHONE HYDROCHLORIDE 0.2 MG: 2 INJECTION, SOLUTION INTRAMUSCULAR; INTRAVENOUS; SUBCUTANEOUS at 17:27

## 2018-11-27 RX ADMIN — DICYCLOMINE HYDROCHLORIDE 10 MG: 10 CAPSULE ORAL at 22:18

## 2018-11-27 RX ADMIN — ROSUVASTATIN CALCIUM 5 MG: 10 TABLET, FILM COATED ORAL at 22:19

## 2018-11-27 RX ADMIN — SENNOSIDES AND DOCUSATE SODIUM 1 TABLET: 8.6; 5 TABLET ORAL at 19:33

## 2018-11-27 RX ADMIN — GLYCOPYRROLATE 0.6 MG: 0.2 INJECTION INTRAMUSCULAR; INTRAVENOUS at 15:10

## 2018-11-27 RX ADMIN — Medication: at 17:07

## 2018-11-27 RX ADMIN — SODIUM CHLORIDE, POTASSIUM CHLORIDE, SODIUM LACTATE AND CALCIUM CHLORIDE: 600; 310; 30; 20 INJECTION, SOLUTION INTRAVENOUS at 16:09

## 2018-11-27 RX ADMIN — FENTANYL CITRATE 25 MCG: 50 INJECTION, SOLUTION INTRAMUSCULAR; INTRAVENOUS at 16:59

## 2018-11-27 RX ADMIN — KETOROLAC TROMETHAMINE 15 MG: 30 INJECTION, SOLUTION INTRAMUSCULAR at 15:00

## 2018-11-27 RX ADMIN — VANCOMYCIN HYDROCHLORIDE 1500 MG: 10 INJECTION, POWDER, LYOPHILIZED, FOR SOLUTION INTRAVENOUS at 14:10

## 2018-11-27 RX ADMIN — GLYCOPYRROLATE 0.2 MG: 0.2 INJECTION INTRAMUSCULAR; INTRAVENOUS at 14:26

## 2018-11-27 RX ADMIN — PROPOFOL 200 MG: 10 INJECTION, EMULSION INTRAVENOUS at 13:40

## 2018-11-27 NOTE — PERIOP NOTES
TRANSFER - OUT REPORT: 
 
Verbal report given to Carlito(name) on Ady Garcia  being transferred to [facility](unit) for routine post - op Report consisted of patients Situation, Background, Assessment and  
Recommendations(SBAR). Time Pre op antibiotic given:1410 Anesthesia Stop time: 9990 0927 Shin Present on Transfer to floor:yes Order for Shin on Chart:yes Discharge Prescriptions with Chart:no Information from the following report(s) SBAR, Kardex, OR Summary, Procedure Summary, Intake/Output, MAR, Recent Results and Med Rec Status was reviewed with the receiving nurse. Opportunity for questions and clarification was provided. Is the patient on 02? YES 
     L/Min 2 Other Is the patient on a monitor? NO Is the nurse transporting with the patient? NO Surgical Waiting Area notified of patient's transfer from PACU? YES The following personal items collected during your admission accompanied patient upon transfer:  
Dental Appliance: Dental Appliances: None Vision:   
Hearing Aid:   
Jewelry: Jewelry: None Clothing: Clothing: Footwear, Pants, Shirt, Socks, Undergarments(sent to [employer]) Other Valuables: Other Valuables: None Valuables sent to safe:   
 
 
Clothes and glasses sent to floor.

## 2018-11-27 NOTE — OP NOTES
[address]  OPERATIVE REPORT    Name:KISHORE MERCADO  MR#: [id number]  : 1955  ACCOUNT #: [de-identified]   DATE OF SERVICE: 2018    PREOPERATIVE DIAGNOSES:  Status post L4-5 fusion, lumbar spondylolisthesis, L3-4, lumbar stenosis, L3-4, lumbar kyphosis. POSTOPERATIVE DIAGNOSES:  Status post L4-5 fusion, lumbar spondylolisthesis, L3-4, lumbar stenosis, L3-4, lumbar kyphosis. PROCEDURE PERFORMED:  Exploration of fusion, revision laminectomy, L3-4, L4-5, posterior revision lumbar fusion, L3-L5, transforaminal interbody fusion, L3-5, posterior segment fusion L3-5. SURGEON:  Theron Coronado MD    ASSISTANT:  Nhan Pizarro PA-C    ANESTHESIA:  General.    ESTIMATED BLOOD LOSS:  Approximately 150 mL. SPECIMENS REMOVED:  None    COMPLICATIONS:  No complications. IMPLANTS:  Medtronic Solera    INDICATIONS:  This is a pleasant 47-year-old female with chronic back pain, bilateral leg pain, previous L4-L5 fusion, had done very well, has developed adjacent level disease at L3-4 with severe spinal stenosis as well as spondylolisthesis. She had failed conservative treatment, understood the risks and benefits, and elected to proceed. DESCRIPTION OF PROCEDURE:  Patient was identified, brought to the operative suite and underwent general anesthesia without difficulty. Shin catheter placed. Preoperative neuromonitoring was placed, baselines obtained, and remained stable throughout the surgical procedure. Back was prepped and draped sterilely. After prepping and draping, a timeout was performed. Incision was made in the midline. Dissection was carried down with exposure from L3 with previous exposure of the previous instrumentation at L4-5, transverse process of L3 was identified as well as the L3-4 facet, which was hypertrophic and widened. We exposed the hardware and removed this without difficulty. Good solid fusion mass noted at L4-5.   The transverse processes of L3 were identified and cleared of soft tissue, as well as clearing muscle tissue off the interspinous ligament between the L4-5 fusion and L3 spinous process. We then started a wide revision laminectomy with removal of the remainder of the L4 lamina and inferior portion of the L3. There was severe lateral recess foraminal stenosis due to facet hypertrophy as well as ligamentum hypertrophy. We completed a Marnie osteotomy with removal of the remainder of the facet as well as carrying this through the pars region for decompression. We were able to visualize the L4 nerve roots in the foramen as well as all the way to the pedicle of L3. Once this had been completed bilaterally, we then cannulated the L3 pedicle screws bilaterally, tested with a ball-tipped probe, and then we placed Medtronic Solera screws, 6.5 x 45 mm screws. These were tested with the ball-tipped probe as well as neuro monitor with no breaches above 15 milliamps, at which time we then continued with an annulotomy. Then, we did an annulotomy on the left hand side. A complete diskectomy was done with evacuation of the disk material with pituitary rongeurs, curved curettes, as well as trial spacers up to approximately 11 mm. After evacuation of the disk, we irrigated the wound with pulse lavage bacitracin 3000 mL. We then packed a medium Harika allograft into the anterior one-third column. This was followed by a Spine Wave StaXx cage 25 x 9 mm with 15- degree lordosis obliquely across the disk space. We extended this to approximately 11 mm, at which time we then replaced the screws at L4 and L5 with Medtronic Solera screws. We irrigated the wounds again. We decorticated the transverse process as well as previous fusion mass and placed the remainder of the Harika allograft, local bone graft, as well as a medium Signafuse into the lateral gutters.   We bent 60 mm rods to appropriate lordosis, attached to the pedicle screws and set screws, and final tightening performed. Medium Hemovac placed. Final closure was over the medium Hemovac. Vancomycin powder in the subcutaneous tissue, 0 Vicryl in the fascial layer, 2-0 Vicryl in the subcutaneous layer, and 3-0 Vicryl in the skin. The patient returned to the PACU in stable condition.       MD Eduarda Vincent / TOMAS  D: 11/27/2018 16:17     T: 11/27/2018 17:43  JOB #: [id number]

## 2018-11-27 NOTE — ANESTHESIA POSTPROCEDURE EVALUATION
Procedure(s): LUMBAR LAMINECTOMY REVISION L3-4 WITH L3-5 FUSION/L3-4 OSTEOTOMY. Anesthesia Post Evaluation Patient location during evaluation: PACU Patient participation: complete - patient participated Level of consciousness: awake and alert Pain management: adequate Airway patency: patent Anesthetic complications: no 
Cardiovascular status: acceptable Respiratory status: acceptable Hydration status: acceptable Comments: I have seen and evaluated the patient and is ready for discharge. Kirkwood Rinne, MD 
 
Post anesthesia nausea and vomiting:  none Visit Vitals /61 Pulse 73 Temp 36.4 °C (97.6 °F) Resp 11 Ht 5' 4\" (1.626 m) Wt 84.8 kg (187 lb) SpO2 100% BMI 32.10 kg/m²

## 2018-11-27 NOTE — BRIEF OP NOTE
BRIEF OPERATIVE NOTE Date of Procedure: 11/27/2018 Preoperative Diagnosis: STENOSIS/SPONDYLOLYSTHEISIS Postoperative Diagnosis: * No post-op diagnosis entered * Procedure(s): LUMBAR LAMINECTOMY REVISION L3-4 WITH L3-5 FUSION/L3-4 OSTEOTOMY Surgeon(s) and Role: Shital Ingram MD - Primary Surgical Assistant: Izabela Salazar PA-C Surgical Staff: 
Circ-1: Myron Morejon Physician Assistant: OMAR Norton Scrub Tech-Relief: Bernardino Sanchez Scrub RN-1: Lacie Hayden RN Event Time In Time Out Incision Start [id number] Incision Close 1604 Anesthesia: General  
Estimated Blood Loss: 200cc Specimens: * No specimens in log * Findings: stenosis Complications: none Implants:  
Implant Name Type Inv. Item Serial No.  Lot No. LRB No. Used Action GRAFT BNE PUTTY BIOACTV 7.5GM -- SIGNAFUSE - SNA  GRAFT BNE PUTTY BIOACTV 7.5GM -- SIGNAFUSE NA [id number] [facility] [id number] N/A 1 Implanted GRAFT BNE [employer] --  - [id number]  GRAFT BNE [employer] LG --  [id number] MUSCULOSKELETAL TRANS 9410 N/A 1 Implanted GRAFT BNE [employer] --  - [id number]  GRAFT BNE [employer] MED --  [id number] MUSCULOSKELETAL TRANS NA N/A 1 Implanted SCR SPNE MAS 6.5X45 CC -- CD HORIZON SOLERA - SNA  SCR SPNE MAS 6.5X45 CC -- CD HORIZON SOLERA NA MEDTRONIC SOFAMOR DANEK  N/A 6 Implanted SPACER SPNE EXP 15D [id number] -- STAXX - SNA  SPACER SPNE EXP 15D [id number] -- STAXX NA SPINEWAVE [id number] N/A 1 Implanted SCR SET SPNE BRK-OFF 5.5MM TI --  - SNA  SCR SET SPNE BRK-OFF 5.5MM TI --  NA MEDTRONIC SOFAMOR DANEK NA N/A 6 Implanted ALEN SPNE CP4 NS CRV 5.5X60MM -- CD HORIZON SOLERA - SNA  ALEN SPNE CP4 NS CRV 5.5X60MM -- CD HORIZON SOLERA NA MEDTRONIC SOFAMOR DANEK NA N/A 2 Implanted

## 2018-11-27 NOTE — ANESTHESIA PREPROCEDURE EVALUATION
Anesthetic History No history of anesthetic complications Review of Systems / Medical History Patient summary reviewed, nursing notes reviewed and pertinent labs reviewed Pulmonary Within defined limits Neuro/Psych Within defined limits Cardiovascular Within defined limits Exercise tolerance: >4 METS 
  
GI/Hepatic/Renal 
  
GERD: well controlled Endo/Other Hypothyroidism: well controlled Obesity and arthritis Other Findings Physical Exam 
 
Airway Mallampati: II 
TM Distance: > 6 cm Neck ROM: normal range of motion Mouth opening: Normal 
 
 Cardiovascular Regular rate and rhythm,  S1 and S2 normal,  no murmur, click, rub, or gallop Dental 
No notable dental hx Pulmonary Breath sounds clear to auscultation Abdominal 
GI exam deferred Other Findings Anesthetic Plan ASA: 3 Anesthesia type: general 
 
 
 
 
Induction: Intravenous Anesthetic plan and risks discussed with: Patient

## 2018-11-28 LAB
ANION GAP SERPL CALC-SCNC: 7 MMOL/L (ref 5–15)
BUN SERPL-MCNC: 6 MG/DL (ref 6–20)
BUN/CREAT SERPL: 11 (ref 12–20)
CALCIUM SERPL-MCNC: 7.9 MG/DL (ref 8.5–10.1)
CHLORIDE SERPL-SCNC: 107 MMOL/L (ref 97–108)
CO2 SERPL-SCNC: 25 MMOL/L (ref 21–32)
CREAT SERPL-MCNC: 0.54 MG/DL (ref 0.55–1.02)
GLUCOSE SERPL-MCNC: 92 MG/DL (ref 65–100)
HGB BLD-MCNC: 9.7 G/DL (ref 11.5–16)
POTASSIUM SERPL-SCNC: 3.8 MMOL/L (ref 3.5–5.1)
SODIUM SERPL-SCNC: 139 MMOL/L (ref 136–145)

## 2018-11-28 PROCEDURE — 97530 THERAPEUTIC ACTIVITIES: CPT

## 2018-11-28 PROCEDURE — 36415 COLL VENOUS BLD VENIPUNCTURE: CPT

## 2018-11-28 PROCEDURE — 74011250636 HC RX REV CODE- 250/636: Performed by: PHYSICIAN ASSISTANT

## 2018-11-28 PROCEDURE — G8988 SELF CARE GOAL STATUS: HCPCS

## 2018-11-28 PROCEDURE — 97161 PT EVAL LOW COMPLEX 20 MIN: CPT

## 2018-11-28 PROCEDURE — 97165 OT EVAL LOW COMPLEX 30 MIN: CPT

## 2018-11-28 PROCEDURE — 80048 BASIC METABOLIC PNL TOTAL CA: CPT

## 2018-11-28 PROCEDURE — 97535 SELF CARE MNGMENT TRAINING: CPT

## 2018-11-28 PROCEDURE — G8978 MOBILITY CURRENT STATUS: HCPCS

## 2018-11-28 PROCEDURE — 77010033678 HC OXYGEN DAILY

## 2018-11-28 PROCEDURE — 85018 HEMOGLOBIN: CPT

## 2018-11-28 PROCEDURE — 94760 N-INVAS EAR/PLS OXIMETRY 1: CPT

## 2018-11-28 PROCEDURE — 65270000029 HC RM PRIVATE

## 2018-11-28 PROCEDURE — G8987 SELF CARE CURRENT STATUS: HCPCS

## 2018-11-28 PROCEDURE — G8979 MOBILITY GOAL STATUS: HCPCS

## 2018-11-28 PROCEDURE — 97116 GAIT TRAINING THERAPY: CPT

## 2018-11-28 PROCEDURE — 74011250637 HC RX REV CODE- 250/637: Performed by: PHYSICIAN ASSISTANT

## 2018-11-28 RX ADMIN — SENNOSIDES AND DOCUSATE SODIUM 1 TABLET: 8.6; 5 TABLET ORAL at 10:10

## 2018-11-28 RX ADMIN — ROSUVASTATIN CALCIUM 5 MG: 10 TABLET, FILM COATED ORAL at 21:08

## 2018-11-28 RX ADMIN — CITALOPRAM HYDROBROMIDE 20 MG: 20 TABLET ORAL at 10:09

## 2018-11-28 RX ADMIN — DICYCLOMINE HYDROCHLORIDE 10 MG: 10 CAPSULE ORAL at 10:09

## 2018-11-28 RX ADMIN — DICYCLOMINE HYDROCHLORIDE 10 MG: 10 CAPSULE ORAL at 21:08

## 2018-11-28 RX ADMIN — KETOROLAC TROMETHAMINE 15 MG: 30 INJECTION, SOLUTION INTRAMUSCULAR at 12:23

## 2018-11-28 RX ADMIN — SENNOSIDES AND DOCUSATE SODIUM 1 TABLET: 8.6; 5 TABLET ORAL at 18:06

## 2018-11-28 RX ADMIN — ACETAMINOPHEN 1000 MG: 500 TABLET ORAL at 12:23

## 2018-11-28 RX ADMIN — OXYCODONE HYDROCHLORIDE 10 MG: 5 TABLET ORAL at 18:06

## 2018-11-28 RX ADMIN — PSYLLIUM HUSK 1 PACKET: 3.4 POWDER ORAL at 10:08

## 2018-11-28 RX ADMIN — ACETAMINOPHEN 1000 MG: 500 TABLET ORAL at 06:42

## 2018-11-28 RX ADMIN — VANCOMYCIN HYDROCHLORIDE 1500 MG: 10 INJECTION, POWDER, LYOPHILIZED, FOR SOLUTION INTRAVENOUS at 02:29

## 2018-11-28 RX ADMIN — Medication 10 ML: at 14:40

## 2018-11-28 RX ADMIN — Medication 10 ML: at 06:46

## 2018-11-28 RX ADMIN — DICYCLOMINE HYDROCHLORIDE 10 MG: 10 CAPSULE ORAL at 16:21

## 2018-11-28 RX ADMIN — LEVOTHYROXINE SODIUM 50 MCG: 50 TABLET ORAL at 06:43

## 2018-11-28 RX ADMIN — OXYCODONE HYDROCHLORIDE 10 MG: 5 TABLET ORAL at 21:08

## 2018-11-28 RX ADMIN — CETIRIZINE HYDROCHLORIDE 10 MG: 10 TABLET, FILM COATED ORAL at 21:08

## 2018-11-28 RX ADMIN — KETOROLAC TROMETHAMINE 15 MG: 30 INJECTION, SOLUTION INTRAMUSCULAR at 06:43

## 2018-11-28 RX ADMIN — PANTOPRAZOLE SODIUM 40 MG: 40 TABLET, DELAYED RELEASE ORAL at 06:43

## 2018-11-28 RX ADMIN — Medication 10 ML: at 21:09

## 2018-11-28 RX ADMIN — GABAPENTIN 600 MG: 300 CAPSULE ORAL at 21:08

## 2018-11-28 RX ADMIN — ACETAMINOPHEN 1000 MG: 500 TABLET ORAL at 01:01

## 2018-11-28 RX ADMIN — KETOROLAC TROMETHAMINE 15 MG: 30 INJECTION, SOLUTION INTRAMUSCULAR at 01:02

## 2018-11-28 RX ADMIN — OXYCODONE HYDROCHLORIDE 10 MG: 5 TABLET ORAL at 10:09

## 2018-11-28 NOTE — PROGRESS NOTES
Problem: Falls - Risk of 
Goal: *Absence of Falls Document Lc Mir Fall Risk and appropriate interventions in the flowsheet. Outcome: Progressing Towards Goal 
Fall Risk Interventions: 
Mobility Interventions: Communicate number of staff needed for ambulation/transfer, OT consult for ADLs, Patient to call before getting OOB, PT Consult for mobility concerns Medication Interventions: Teach patient to arise slowly Elimination Interventions: Call light in reach, Toileting schedule/hourly rounds

## 2018-11-28 NOTE — PROGRESS NOTES
Orthopedic Spine Progress Note Post Op day: 1 Day Post-Op 2018 8:04 AM  
Admit Date: 2018 Procedure: Procedure(s): LUMBAR LAMINECTOMY REVISION L3-4 WITH L3-5 FUSION/L3-4 OSTEOTOMY Subjective:  
 
Angely Brower appears well. Pain seems to be managed. Tolerating diet No N/V Shin in place Drain in place Pain Control:  
Pain Assessment Pain Scale 1: Numeric (0 - 10) Pain Intensity 1: 3 Pain Onset 1: posto Pain Location 1: Back Pain Orientation 1: Lower Pain Description 1: Aching Pain Intervention(s) 1: Encouraged PCA Objective:  
 
 
  
Physical Exam: 
General:  Alert and oriented. No acute distress. Heart:  Respirations unlabored. Abdomen:  
Extremities: Soft, non-tender. No evidence of cyanosis. Pulses palpable in both upper and lower extremities. Neurologic: 
Musculoskeletal:  No new motor deficits. Neurovascular exam within normal limits. Sensation stable. Motor: unchanged C5-T1 and L2-S1. Boaz's sign negative in bilateral lower extremities. Calves soft, nontender upon palpation and with passive twitch. Moves both upper and lower extremities. Incision: clean, dry, and intact. No significant erythema or swelling. No active drainage noted. Vital Signs:   
Blood pressure 124/75, pulse 71, temperature 98.1 °F (36.7 °C), resp. rate 16, height 5' 4\" (1.626 m), weight 84.8 kg (187 lb), SpO2 100 %. Temp (24hrs), Av.5 °F (36.4 °C), Min:97 °F (36.1 °C), Max:98.1 °F (36.7 °C) LAB:   
Recent Labs  
  18 
0510 HGB 9.7* Lab Results Component Value Date/Time Sodium 139 2018 05:10 AM  
 Potassium 3.8 2018 05:10 AM  
 Chloride 107 2018 05:10 AM  
 CO2 25 2018 05:10 AM  
 Glucose 92 2018 05:10 AM  
 BUN 6 2018 05:10 AM  
 Creatinine 0.54 (L) 2018 05:10 AM  
 Calcium 7.9 (L) 2018 05:10 AM  
 
 
Intake/Output:No intake/output data recorded. 1901 -  07 In: 1300 [I.V.:1300] Out: 1824 [Urine:1900; Drains:210] Assessment:  
Patient is 1 Day Post-Op s/p Procedure(s): LUMBAR LAMINECTOMY REVISION L3-4 WITH L3-5 FUSION/L3-4 OSTEOTOMY Plan: 1. PT/OT - family will bring brace. She may work with PT without the brace 2. D/C PCA and begin established methods of pain control 3. VTE Prophylaxes - TEDS & SCDs 4. Encourage use of ICS 5. Remove drain once output is <80 
6. Remove Shin once mobilizing with PT 
7. Discharge pending Signed By: Bacilio Kay PA-C

## 2018-11-28 NOTE — PROGRESS NOTES
Problem: Self Care Deficits Care Plan (Adult) Goal: *Acute Goals and Plan of Care (Insert Text) Occupational Therapy Goals Initiated 11/28/2018 1. Patient will perform lower body dressing with modified independence using AE PRN within 7 days. 2.  Patient will perform toilet transfer with modified independence using most appropriate DME within 7 days. 3.  Patient will grooming at the sink with modified independence within 7 days. 4.  Patient will don/doff back brace at modified independence within 7 days. 5.  Patient will verbalize/demonstrate 3/3 back precautions during ADL tasks without cues within 7 days. Occupational Therapy EVALUATION Patient: Donell Nunez (17 y.o. female) Date: 11/28/2018 Primary Diagnosis: STENOSIS/SPONDYLOLYSTHEISIS Spinal stenosis, lumbar Procedure(s) (LRB): LUMBAR LAMINECTOMY REVISION L3-4 WITH L3-5 FUSION/L3-4 OSTEOTOMY (N/A) 1 Day Post-Op Precautions: Brace with OOB mobility  Back, Fall ASSESSMENT : 
Based on the objective data described below, the patient presents with IND-Min A (inferred increased assist for donning TLSO), up to [address] [place] [address] for lower body ADLs, & CGA-Min A for functional mobility s/p revision laminectomy & fusion POD #1. PTA, patient IND, working as a , living with spouse. Received in supine, agreeable to therapy & able to recall 3/3 spinal precautions, completing bed mobility with CGA-Min A & cues for LRT. Able to tailor sit EOB to doff/don socks, CGA-Min A x1-2 for ambulation in room/bathroom & functional transfers from bed & toilet with cues for safety provided. Educated on compensatory strategies for grooming & lower body dressing, will benefit from continued practice & education. Patient's  to bring in brace, therefore patient returned to supine with Min A for BLEs, educated on when to wear.  Anticipate patient will progress well, will have support from a friend coming to stay for a few days. Recommend HHOT upon d/c. Patient will benefit from skilled intervention to address the above impairments. Patients rehabilitation potential is considered to be Good Factors which may influence rehabilitation potential include:  
[]             None noted []             Mental ability/status []             Medical condition []             Home/family situation and support systems []             Safety awareness []             Pain tolerance/management 
[]             Other: PLAN : 
Recommendations and Planned Interventions: 
[x]               Self Care Training                  [x]        Therapeutic Activities [x]               Functional Mobility Training    []        Cognitive Retraining 
[x]               Therapeutic Exercises           [x]        Endurance Activities [x]               Balance Training                   []        Neuromuscular Re-Education []               Visual/Perceptual Training     [x]   Home Safety Training 
[x]               Patient Education                 [x]        Family Training/Education []               Other (comment): Frequency/Duration: Patient will be followed by occupational therapy 5 times a week to address goals. Discharge Recommendations: Home Health Further Equipment Recommendations for Discharge: TBD SUBJECTIVE:  
Patient stated My balance feels a little off.  OBJECTIVE DATA SUMMARY:  
HISTORY:  
Past Medical History:  
Diagnosis Date  Allergic rhinitis 12/7/2009  Arthritis  Back pain 12/7/2009  Colitis  Depression 12/7/2009  GERD (gastroesophageal reflux disease)  Hypercholesterolemia  Hyperlipidemia 12/7/2009  Hypothyroidism 12/7/2009  Ill-defined condition  MRSA carrier 11/19/2018  Obesity 12/7/2009  Raynaud disease Past Surgical History:  
Procedure Laterality Date  BREAST SURGERY PROCEDURE UNLISTED  2-2003  
 reduction  ENDOSCOPY, COLON, DIAGNOSTIC    
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 Has a friend coming to stay with her for a few days once d/c home,  will also be there Home Situation Home Environment: Private residence # Steps to Enter: 6 Rails to Enter: Yes Hand Rails : Right One/Two Story Residence: Two story # of Interior Steps: 15 Interior Rails: Right Living Alone: No 
Support Systems: Spouse/Significant Other/Partner, Friends \ neighbors Patient Expects to be Discharged to[de-identified] Private residence Current DME Used/Available at Home: Cane, straight, Raised toilet seat, Commode, bedside Tub or Shower Type: Shower(built in seat) Hand dominance: RightEXAMINATION OF PERFORMANCE DEFICITS: 
Cognitive/Behavioral Status: 
Neurologic State: Alert Orientation Level: Oriented X4 Cognition: Appropriate for age attention/concentration; Follows commands Perception: Appears intact Perseveration: No perseveration noted Safety/Judgement: Awareness of environment; Fall prevention Skin: Appears intact Edema: None noted in BUEs Hearing: 
  
Vision/Perceptual:   
Tracking: Able to track stimulus in all quadrants w/o difficulty Acuity: Within Defined Limits Corrective Lenses: Glasses Range of Motion: In [address] AROM: Within functional limits Strength: In [address] Strength: Generally decreased, functional 
  
  
  
  
Coordination: 
Coordination: Within functional limits Fine Motor Skills-Upper: Left Intact; Right Intact Gross Motor Skills-Upper: Left Intact; Right Intact Tone & Sensation: 
Tone: Normal 
Sensation: Impaired(baseline numbness LLE foot to knee, RLE foot) Balance: 
Sitting: Intact Standing: Impaired Standing - Static: Good Standing - Dynamic : Fair Functional Mobility and Transfers for ADLs:Bed Mobility: 
Rolling: Contact guard assistance Supine to Sit: Minimum assistance Sit to Supine: Minimum assistance Scooting: Contact guard assistance Transfers: 
Sit to Stand: Minimum assistance;Assist x2; Additional time Stand to Sit: Contact guard assistance Bed to Chair: (deferred as brace not yet arrived) Bathroom Mobility: Contact guard assistance Toilet Transfer : Contact guard assistance; Adaptive equipment(grab bars, cues for technique) ADL Assessment: 
Feeding: Independent Oral Facial Hygiene/Grooming: Stand-by assistance(infer cues for spinal precautions) Bathing: Minimum assistance(infer for standing balance ) Upper Body Dressing: Minimum assistance(including TLSO, inferred per act reymundo & mobility) Lower Body Dressing: Minimum assistance(infer for standing balance & safety) Toileting: Contact guard assistance(infer for standing balance with hygiene) ADL Intervention and task modifications: 
  
 
Grooming Brushing Teeth: Compensatory technique training Cues: Verbal cues provided Lower Body Bathing Lower Body : Compensatory technique training(positioning education ) Position Performed: Seated edge of bed Cues: Verbal cues provided;Visual cues provided Upper Body Dressing Assistance Orthotics(Brace): Compensatory technique training(TLSO not yet arrived,  to bring, ed on wearing) Lower Body Dressing Assistance Dressing Assistance: Supervision/set up Underpants: Compensatory technique training Pants With Elastic Waist: Compensatory technique training Pants With Button/Zipper: Compensatory technique training Socks: Supervision/set-up; Compensatory technique training Leg Crossed Method Used: Yes Position Performed: Seated edge of bed Cues: Verbal cues provided;Visual cues provided Toileting Toileting Assistance: Contact guard assistance(simulated in bathroom) Bladder Hygiene: Total assistance (dependent)(chandler) Clothing Management: Contact guard assistance Cues: Verbal cues provided;Visual cues provided Adaptive Equipment: Grab bars Cognitive Retraining Safety/Judgement: Awareness of environment; Fall prevention Therapeutic Exercise: 
Ambulation in room & bathroom with CGA & cues for safety/spinal precautions Functional Measure: 
Barthel Index: 
 
Bathin Bladder: 0 Bowels: 10 
Groomin Dressin Feeding: 10 Mobility: 10 Stairs: 0 Toilet Use: 5 Transfer (Bed to Chair and Back): 10 Total: 55 Barthel and G-code impairment scale: 
Percentage of impairment CH 
0% CI 
1-19% CJ 
20-39% CK 
40-59% CL 
60-79% CM 
80-99% CN 
100% Barthel Score 0-100 100 99-80 79-60 59-40 20-39 1-19 
 0 Barthel Score 0-20 20 17-19 13-16 9-12 5-8 1-4 0 The Barthel ADL Index: Guidelines 1. The index should be used as a record of what a patient does, not as a record of what a patient could do. 2. The main aim is to establish degree of independence from any help, physical or verbal, however minor and for whatever reason. 3. The need for supervision renders the patient not independent. 4. A patient's performance should be established using the best available evidence. Asking the patient, friends/relatives and nurses are the usual sources, but direct observation and common sense are also important. However direct testing is not needed. 5. Usually the patient's performance over the preceding 24-48 hours is important, but occasionally longer periods will be relevant. 6. Middle categories imply that the patient supplies over 50 per cent of the effort. 7. Use of aids to be independent is allowed. Yuniel Juarez., Barthel, D.W. (3665). Functional evaluation: the Barthel Index. [facility] (14)2. REZA Epstein, Flory Mckoy., Ozzie Orellana., Marga, [address] [place] [address] ().  Measuring the change indisability after inpatient rehabilitation; comparison of the responsiveness of the Barthel Index and Functional [address] Measure. Journal of Neurology, Neurosurgery, and Psychiatry, 66(4), 563-986. JOSE Sena, ANGELINA Best, & Marcos Bryant M.A. (2004.) Assessment of post-stroke quality of life in cost-effectiveness studies: The usefulness of the Barthel Index and the EuroQoL-5D. [facility], 13, 306-39 G codes: In compliance with CMSs Claims Based Outcome Reporting, the following G-code set was chosen for this patient based on their primary functional limitation being treated: The outcome measure chosen to determine the severity of the functional limitation was the Barthel Index with a score of 55/100 which was correlated with the impairment scale. ? Self Care:  
  - CURRENT STATUS: CK - 40%-59% impaired, limited or restricted  - GOAL STATUS: CJ - 20%-39% impaired, limited or restricted  - D/C STATUS:  ---------------To be determined--------------- Occupational Therapy Evaluation Charge Determination History Examination Decision-Making LOW Complexity : Brief history review  LOW Complexity : 1-3 performance deficits relating to physical, cognitive , or psychosocial skils that result in activity limitations and / or participation restrictions  LOW Complexity : No comorbidities that affect functional and no verbal or physical assistance needed to complete eval tasks Based on the above components, the patient evaluation is determined to be of the following complexity level: LOW Pain: 
Pain Scale 1: Numeric (0 - 10) Pain Intensity 1: 3 Pain Location 1: Back Pain Orientation 1: Lower Pain Description 1: Radiating; [place] Pain Intervention(s) 1: Medication (see MAR) Activity Tolerance:  
Good Please refer to the flowsheet for vital signs taken during this treatment. After treatment:  
[] Patient left in no apparent distress sitting up in chair [x] Patient left in no apparent distress in bed 
[x] Call bell left within reach [x] Nursing notified 
[] Caregiver present 
[] Bed alarm activated COMMUNICATION/EDUCATION:  
The patients plan of care was discussed with: Physical Therapist and Registered Nurse. [x] Home safety education was provided and the patient/caregiver indicated understanding. [x] Patient/family have participated as able in goal setting and plan of care. [x] Patient/family agree to work toward stated goals and plan of care. [] Patient understands intent and goals of therapy, but is neutral about his/her participation. [] Patient is unable to participate in goal setting and plan of care. This patients plan of care is appropriate for delegation to \[facility]\"". Thank you for this referral. 
Rey Wilson OT Time Calculation: 35 mins

## 2018-11-28 NOTE — PROGRESS NOTES
Problem: Mobility Impaired (Adult and Pediatric) Goal: *Acute Goals and Plan of Care (Insert Text) Physical Therapy Goals Initiated 11/28/2018 1. Patient will move from supine to sit and sit to supine  and roll side to side in bed with independence within 4 days. 2. Patient will perform sit to stand with independence within 4 days. 3. Patient will ambulate with independence for 150 feet with the least restrictive device within 4 days. 4. Patient will ascend/descend 13 stairs with 1 handrail(s) with modified independence within 4 days. 5. Patient will verbalize and demonstrate understanding of spinal precautions (No bending, lifting greater than 5 lbs, or twisting; log-roll technique; frequent repositioning as instructed) within 4 days. physical Therapy EVALUATION Patient: Ac Ford (89 y.o. female) Date: 11/28/2018 Primary Diagnosis: STENOSIS/SPONDYLOLYSTHEISIS Spinal stenosis, lumbar Procedure(s) (LRB): LUMBAR LAMINECTOMY REVISION L3-4 WITH L3-5 FUSION/L3-4 OSTEOTOMY (N/A) 1 Day Post-Op Precautions:   Back, Fall,No bending, no lifting greater than 5 lbs, no twisting, log-roll technique, repositioning every 20-30 min except when sleeping, brace when OOB( will bring in brace today) ASSESSMENT : 
Based on the objective data described below, the patient presents with decreased activity tolerance due to severe back pain, 8/10, chronic numbness and tingling bilateral feet, L > R, and L distal extremity, Raynaud's with cold hands and feet, decreased generalized strength, decreased standing balance, and impaired functional mobility below her baseline level of independence. Pt reports she does not use an assistive device except for using a cane for the last several days due to not taking her antiinflammatory prior to surgery. Pt had one fall in the last year, January 2018, resulting in fractured right ankle and back injury.   Pt was provided with education regarding back precautions. She recalled all precautions from previous lumbar fusion in 2015. Pt performed log roll transfer to EOB with CGA, sit to stand with minimal assist x 2, and tolerated ambulation x 50 feet with minimal assist(hand hold). Pt ambulates with slow, apprehensive gait due to unsteadiness and severe back pain. Pt was assisted back to bed with minimal assist x 2 as she does not have her TLSO yet. Anticipate pt will progress well and is appropriate to return home with her . She also has a friend that will be staying with her for 10 days or so. Will continue to assess for home care needs pending progress. Patient will benefit from skilled intervention to address the above impairments. Patients rehabilitation potential is considered to be Good Factors which may influence rehabilitation potential include:  
[]         None noted 
[]         Mental ability/status []         Medical condition 
[]         Home/family situation and support systems 
[]         Safety awareness [x]         Pain tolerance/management 
[]         Other: PLAN : 
Recommendations and Planned Interventions: 
[x]           Bed Mobility Training             []    Neuromuscular Re-Education 
[x]           Transfer Training                   [x]    Orthotic/Prosthetic Training 
[x]           Gait Training                         []    Modalities []           Therapeutic Exercises           []    Edema Management/Control 
[]           Therapeutic Activities            [x]    Patient and Family Training/Education 
[]           Other (comment): Frequency/Duration: Patient will be followed by physical therapy  twice daily to address goals. Discharge Recommendations: Home Health, To Be Determined and None Further Equipment Recommendations for Discharge: reacher recommended SUBJECTIVE:  
Patient stated  I have permanent numbness in my feet.  I should have had my first surgery sooner.   Pt reports her  will bring in her brace today. OBJECTIVE DATA SUMMARY:  
HISTORY:   
Past Medical History:  
Diagnosis Date  Allergic rhinitis 12/7/2009  Arthritis  Back pain 12/7/2009  Colitis  Depression 12/7/2009  GERD (gastroesophageal reflux disease)  Hypercholesterolemia  Hyperlipidemia 12/7/2009  Hypothyroidism 12/7/2009  Ill-defined condition  MRSA carrier 11/19/2018  Obesity 12/7/2009  Raynaud disease Past Surgical History:  
Procedure Laterality Date  BREAST SURGERY PROCEDURE UNLISTED  2-2003  
 reduction  ENDOSCOPY, COLON, DIAGNOSTIC    
 HX ORTHOPAEDIC  2015 LAMINECTOMY AND FUSION OF L4-L5  HX ROTATOR CUFF REPAIR Right 2016 Prior Level of Function/Home Situation: independent, reports using a cane for last several days due to increased pain after being off her antiinflammatory, reports she had a fall last January 2018, fractured right ankle, injured her back,  pt is a , lives with her  and has a friend that plans to stay with her for a while after surgery Personal factors and/or comorbidities impacting plan of care:  
 
Home Situation Home Environment: Private residence # Steps to Enter: 6 Rails to Enter: Yes Hand Rails : Right One/Two Story Residence: Two story # of Interior Steps: 15 Interior Rails: Right Living Alone: No 
Support Systems: Spouse/Significant Other/Partner, Friends \ neighbors Patient Expects to be Discharged to[de-identified] Private residence Current DME Used/Available at Home: Cane, straight, Raised toilet seat, Commode, bedside Tub or Shower Type: Shower(built in seat) EXAMINATION/PRESENTATION/DECISION MAKING: Critical Behavior: 
Neurologic State: Alert, Appropriate for age Orientation Level: Oriented X4 Cognition: Appropriate decision making, Appropriate for age attention/concentration, Appropriate safety awareness Safety/Judgement: Good awareness of safety precautions, Fall prevention, Awareness of environment, Insight into deficits, Home safety Skin:  Dressing, drain intact Range Of Motion: 
 within functional limits Strength:   
 generally decreased, functional  
  
  
  
  
  
  
Tone & Sensation:  
 chronic numbness and tingling of bilateral feet, L worse than R, numbness extends to her knee on LLE Coordination: 
 intact Functional Mobility: 
Bed Mobility: 
Rolling: Contact guard assistance; Additional time;Assist x1 Supine to Sit: contact guard assistance; Additional time;Assist x1 
 verbal cueing for log roll sequence Sit to Supine: minimal assist x 2, additional time Transfers: 
Sit to Stand: Minimum assistance; Additional time;Assist x2 Stand to Sit: Contact guard assistance; Additional time;Assist x2 Balance:  
Sitting: Intact Standing: Impaired Standing - Static: Good Standing - Dynamic : FairAmbulation/Gait Training:Distance (ft): 50 Feet (ft) Assistive Device: Gait belt, reaching for rail however given hand hold assist 
Ambulation - Level of Assistance: Minimal assistance;Assist x1 Gait Abnormalities: Antalgic;Decreased step clearance, apprehensive due to mild unsteadiness Base of Support: Widened Speed/Lashay: Pace decreased (<100 feet/min) Step Length: Left shortened;Right shortened Functional Measure: 
Tinetti test: 
 
Sitting Balance: 1 Arises: 1 Attempts to Rise: 1 Immediate Standing Balance: 2 Standing Balance: 1 Nudged: 1 Eyes Closed: 1 Turn 360 Degrees - Continuous/Discontinuous: 1 Turn 360 Degrees - Steady/Unsteady: 1 Sitting Down: 2 Balance Score: 12 Indication of Gait: 1 
R Step Length/Height: 1 L Step Length/Height: 1 
R Foot Clearance: 1 L Foot Clearance: 1 Step Symmetry: 1 Step Continuity: 1 Path: 1 Trunk: 2 Walking Time: 0 Gait Score: 11 Total Score: 23 
 
 
 Tinetti Test and G-code impairment scale: 
Percentage of Impairment CH 
 
0% 
 CI 
 
1-19% CJ 
 
20-39% CK 
 
40-59% CL 
 
60-79% CM 
 
80-99% CN  
 
100% Tinetti Score 0-28 28 23-27 17-22 12-16 6-11 1-5 0 Tinetti Tool Score Risk of Falls 
<19 = High Fall Risk 19-24 = Moderate Fall Risk 25-28 = Low Fall Risk Tinetti ME. Performance-Oriented Assessment of Mobility Problems in Elderly Patients. [facility] 66; [id number]. (Scoring Description: PT Bulletin Feb. 10, 1993) Older adults: Cindy Caballero et al, 2009; n = [address] elderly evaluated with ABC, RICKEY, ADL, and IADL) · Mean RICKEY score for males aged 69-68 years = 26.21(3.40) · Mean RICKYE score for females age 69-68 years = 25.16(4.30) · Mean RICKEY score for males over 80 years = 23.29(6.02) · Mean RICKEY score for females over 80 years = 17.20(8.32) G codes: In compliance with CMSs Claims Based Outcome Reporting, the following G-code set was chosen for this patient based on their primary functional limitation being treated: The outcome measure chosen to determine the severity of the functional limitation was the Tinetti with a score of 23/28 which was correlated with the impairment scale. ? Mobility - Walking and Moving Around:  
  - CURRENT STATUS: CI - 1%-19% impaired, limited or restricted  - GOAL STATUS: CH - 0% impaired, limited or restricted  - D/C STATUS:  ---------------To be determined--------------- Physical Therapy Evaluation Charge Determination History Examination Presentation Decision-Making MEDIUM  Complexity : 1-2 comorbidities / personal factors will impact the outcome/ POC  LOW Complexity : 1-2 Standardized tests and measures addressing body structure, function, activity limitation and / or participation in recreation  LOW Complexity : Stable, uncomplicated  LOW Complexity : FOTO score of  Based on the above components, the patient evaluation is determined to be of the following complexity level: LOW Pain: 
Pain Scale 1: Numeric (0 - 10) Pain Intensity 1: 8 Pain Location 1: Back Pain Orientation 1: Lower Pain Description 1: Radiating; Liz Tan Pain Intervention(s) 1: Medication (see MAR) Activity Tolerance:  
Fair, limited by severe back pain Vitals:  
 11/28/18 6408 11/28/18 4339 11/28/18 4824 BP: 118/75 106/75 159/76 BP 1 Location: Right arm Right arm Right arm BP Patient Position: Supine Sitting Standing Pulse: 71 88 86 Resp:     
Temp:   98.3 °F (36.8 °C) SpO2:     
Weight:     
Height:     
 
Please refer to the flowsheet for vital signs taken during this treatment. After treatment:  
[]         Patient left in no apparent distress sitting up in chair 
[x]         Patient left in no apparent distress in bed 
[x]         Call bell left within reach [x]         Nursing notified 
[]         Caregiver present 
[]         Bed alarm activated COMMUNICATION/EDUCATION:  
The patients plan of care was discussed with: Registered Nurse. [x]         Fall prevention education was provided and the patient/caregiver indicated understanding. []         Patient/family have participated as able in goal setting and plan of care. [x]         Patient/family agree to work toward stated goals and plan of care. []         Patient understands intent and goals of therapy, but is neutral about his/her participation. []         Patient is unable to participate in goal setting and plan of care. Thank you for this referral. 
Kacey Bell Time Calculation: 40 mins

## 2018-11-28 NOTE — PROGRESS NOTES
Problem: Mobility Impaired (Adult and Pediatric) Goal: *Acute Goals and Plan of Care (Insert Text) Physical Therapy Goals Initiated 11/28/2018 1. Patient will move from supine to sit and sit to supine  and roll side to side in bed with independence within 4 days. 2. Patient will perform sit to stand with independence within 4 days. 3. Patient will ambulate with independence for 150 feet with the least restrictive device within 4 days. 4. Patient will ascend/descend 13 stairs with 1 handrail(s) with modified independence within 4 days. 5. Patient will verbalize and demonstrate understanding of spinal precautions (No bending, lifting greater than 5 lbs, or twisting; log-roll technique; frequent repositioning as instructed) within 4 days. physical Therapy TREATMENT Patient: Ady Garcia (40 y.o. female) Date: 11/28/2018 Diagnosis: STENOSIS/SPONDYLOLYSTHEISIS Spinal stenosis, lumbar <principal problem not specified> Procedure(s) (LRB): LUMBAR LAMINECTOMY REVISION L3-4 WITH L3-5 FUSION/L3-4 OSTEOTOMY (N/A) 1 Day Post-Op Precautions: Back, Fall, No bending, no lifting greater than 5 lbs, no twisting, log-roll technique, repositioning every 20-30 min except when sleeping, brace when OOB Chart, physical therapy assessment, plan of care and goals were reviewed. ASSESSMENT: 
Pt received in supine and agreeable to participate, her  brought in her brace, performed log roll transfer to EOB with verbal cueing and minimal assist, donned TLSO with maximum assistance, sit to stand with minimal assist, pt reports increased pain upon standing, ambulated with slow, antalgic gait with minimal assistance(hand hold) x 55 feet, mild increased trunk sway, may benefit from assistive device, recommend trying a cane next session, pt reports pain increased,, 8/10, with ambulation,  pt remained up in chair at end of visit, recommend home health PT 
Progression toward goals: []      Improving appropriately and progressing toward goals [x]      Improving slowly and progressing toward goals 
[]      Not making progress toward goals and plan of care will be adjusted PLAN: 
Patient continues to benefit from skilled intervention to address the above impairments. Continue treatment per established plan of care. Discharge Recommendations:  Home Health Further Equipment Recommendations for Discharge:  Reacher, pt reports she owns a cane SUBJECTIVE:  
Patient stated I have been getting up to the bathroom.  The patient stated 3/3 back precautions. Reviewed all 3 with patient. OBJECTIVE DATA SUMMARY:  
Critical Behavior: 
Neurologic State: Alert Orientation Level: Oriented X4 Cognition: Appropriate decision making, Appropriate safety awareness, Follows commands Safety/Judgement: Awareness of environment, Fall prevention Functional Mobility Training: 
Bed Mobility: 
Log Rolling: supervision Supine to Sit: Minimum assistance Scooting: Contact guard assistance Brace donned with  maximal assistance Transfers: 
Sit to Stand: Minimum assistance;Assist x1;Additional time Stand to Sit: Contact guard assistance Balance: 
Sitting: Intact Standing: Impaired Standing - Static: Good Standing - Dynamic : FairAmbulation/Gait Training: 
Distance (ft): 55 Feet (ft) Assistive Device: Brace/Splint;Gait belt Ambulation - Level of Assistance: Minimal assistance;Assist x1, hand hold assist 
  
  
Gait Abnormalities: Antalgic;Decreased step clearance, mild increased lateral trunk sway Base of Support: Widened Speed/Lashay: Pace decreased (<100 feet/min) Step Length: Left shortened;Right shortened Pain: 
Pain Scale 1: Numeric (0 - 10) Pain Intensity 1: 8 Pain Location 1: Back Pain Orientation 1: Lower Pain Description 1:aching Pain Intervention(s) 1:see MAR Activity Tolerance:  
Fair, limited by severe back pain After treatment:  
[x]  Patient left in no apparent distress sitting up in chair 
[]  Patient left in no apparent distress in bed 
[x]  Call bell left within reach [x]  Nursing notified 
[]  Caregiver present 
[]  Bed alarm activated COMMUNICATION/COLLABORATION:  
The patients plan of care was discussed with: Registered Nurse Barbara Jackson Time Calculation: 14 mins

## 2018-11-29 PROCEDURE — 97116 GAIT TRAINING THERAPY: CPT

## 2018-11-29 PROCEDURE — 65270000029 HC RM PRIVATE

## 2018-11-29 PROCEDURE — 97530 THERAPEUTIC ACTIVITIES: CPT

## 2018-11-29 PROCEDURE — 74011250637 HC RX REV CODE- 250/637: Performed by: PHYSICIAN ASSISTANT

## 2018-11-29 PROCEDURE — 77030011256 HC DRSG MEPILEX <16IN NO BORD MOLN -A

## 2018-11-29 PROCEDURE — 97535 SELF CARE MNGMENT TRAINING: CPT

## 2018-11-29 RX ORDER — OXYCODONE HYDROCHLORIDE 5 MG/1
5-10 TABLET ORAL
Qty: 60 TAB | Refills: 0 | Status: SHIPPED | OUTPATIENT
Start: 2018-11-29

## 2018-11-29 RX ORDER — NALOXONE HYDROCHLORIDE 4 MG/.1ML
SPRAY NASAL
Qty: 2 EACH | Refills: 1 | Status: SHIPPED | OUTPATIENT
Start: 2018-11-29

## 2018-11-29 RX ADMIN — DICYCLOMINE HYDROCHLORIDE 10 MG: 10 CAPSULE ORAL at 16:41

## 2018-11-29 RX ADMIN — OXYCODONE HYDROCHLORIDE 10 MG: 5 TABLET ORAL at 00:14

## 2018-11-29 RX ADMIN — DICYCLOMINE HYDROCHLORIDE 10 MG: 10 CAPSULE ORAL at 21:49

## 2018-11-29 RX ADMIN — SENNOSIDES AND DOCUSATE SODIUM 1 TABLET: 8.6; 5 TABLET ORAL at 09:24

## 2018-11-29 RX ADMIN — OXYCODONE HYDROCHLORIDE 10 MG: 5 TABLET ORAL at 19:37

## 2018-11-29 RX ADMIN — OXYCODONE HYDROCHLORIDE 10 MG: 5 TABLET ORAL at 16:41

## 2018-11-29 RX ADMIN — CETIRIZINE HYDROCHLORIDE 10 MG: 10 TABLET, FILM COATED ORAL at 21:49

## 2018-11-29 RX ADMIN — Medication 10 ML: at 12:52

## 2018-11-29 RX ADMIN — ACETAMINOPHEN 1000 MG: 500 TABLET ORAL at 06:21

## 2018-11-29 RX ADMIN — Medication 10 ML: at 06:21

## 2018-11-29 RX ADMIN — PANTOPRAZOLE SODIUM 40 MG: 40 TABLET, DELAYED RELEASE ORAL at 06:21

## 2018-11-29 RX ADMIN — ROSUVASTATIN CALCIUM 5 MG: 10 TABLET, FILM COATED ORAL at 21:49

## 2018-11-29 RX ADMIN — Medication 10 ML: at 21:52

## 2018-11-29 RX ADMIN — ACETAMINOPHEN 1000 MG: 500 TABLET ORAL at 00:13

## 2018-11-29 RX ADMIN — ACETAMINOPHEN 1000 MG: 500 TABLET ORAL at 12:51

## 2018-11-29 RX ADMIN — CITALOPRAM HYDROBROMIDE 20 MG: 20 TABLET ORAL at 09:24

## 2018-11-29 RX ADMIN — LEVOTHYROXINE SODIUM 50 MCG: 50 TABLET ORAL at 06:20

## 2018-11-29 RX ADMIN — DICYCLOMINE HYDROCHLORIDE 10 MG: 10 CAPSULE ORAL at 09:24

## 2018-11-29 RX ADMIN — OXYCODONE HYDROCHLORIDE 10 MG: 5 TABLET ORAL at 12:51

## 2018-11-29 RX ADMIN — PSYLLIUM HUSK 1 PACKET: 3.4 POWDER ORAL at 09:24

## 2018-11-29 RX ADMIN — OXYCODONE HYDROCHLORIDE 10 MG: 5 TABLET ORAL at 06:21

## 2018-11-29 RX ADMIN — ACETAMINOPHEN 1000 MG: 500 TABLET ORAL at 19:37

## 2018-11-29 RX ADMIN — SENNOSIDES AND DOCUSATE SODIUM 1 TABLET: 8.6; 5 TABLET ORAL at 19:37

## 2018-11-29 RX ADMIN — GABAPENTIN 600 MG: 300 CAPSULE ORAL at 21:49

## 2018-11-29 RX ADMIN — OXYCODONE HYDROCHLORIDE 10 MG: 5 TABLET ORAL at 09:23

## 2018-11-29 NOTE — PROGRESS NOTES
Problem: Mobility Impaired (Adult and Pediatric) Goal: *Acute Goals and Plan of Care (Insert Text) Physical Therapy Goals Initiated 11/28/2018 1. Patient will move from supine to sit and sit to supine  and roll side to side in bed with independence within 4 days. 2. Patient will perform sit to stand with independence within 4 days. 3. Patient will ambulate with independence for 150 feet with the least restrictive device within 4 days. 4. Patient will ascend/descend 13 stairs with 1 handrail(s) with modified independence within 4 days. 5. Patient will verbalize and demonstrate understanding of spinal precautions (No bending, lifting greater than 5 lbs, or twisting; log-roll technique; frequent repositioning as instructed) within 4 days. physical Therapy TREATMENT Patient: Negra Montiel (47 y.o. female) Date: 11/29/2018 Diagnosis: STENOSIS/SPONDYLOLYSTHEISIS Spinal stenosis, lumbar <principal problem not specified> Procedure(s) (LRB): LUMBAR LAMINECTOMY REVISION L3-4 WITH L3-5 FUSION/L3-4 OSTEOTOMY (N/A) 2 Days Post-Op Precautions: Back, Fall Chart, physical therapy assessment, plan of care and goals were reviewed. ASSESSMENT: 
Patient received supine in bed with family present in room. Pt tolerated session well and making progress towards goals. Pt completed bed mobility with standby assist and able to maintain neutral spine position. Pt performed sit<>Stand with SPC with standby assist. Pt ambulated ~40 feet with SPC with min A and noted increased lateral trunk sway and instability. Provided patient with RW for bilateral UE support. Pt demonstrated improved balance and gait with use of RW. Order for RW placed for home use. Pt remained seated in chair with OT. Pt will benefit from HHPT upon discharge. Progression toward goals: 
[x]    Improving appropriately and progressing toward goals 
[]    Improving slowly and progressing toward goals []    Not making progress toward goals and plan of care will be adjusted PLAN: 
Patient continues to benefit from skilled intervention to address the above impairments. Continue treatment per established plan of care. Discharge Recommendations:  Home Health Further Equipment Recommendations for Discharge:  rolling walker SUBJECTIVE:  
Patient stated I feel better with the walker.  OBJECTIVE DATA SUMMARY:  
Critical Behavior: 
Neurologic State: Alert Orientation Level: Oriented X4 Cognition: Follows commands Safety/Judgement: Awareness of environment, Fall prevention Functional Mobility Training: 
Bed Mobility: 
  
Supine to Sit: Stand-by assistance Transfers: 
Sit to Stand: Stand-by assistance Stand to Sit: Stand-by assistance Balance: 
Sitting: Intact Standing: Impaired Standing - Static: Good Standing - Dynamic : FairAmbulation/Gait Training: 
Distance (ft): 85 Feet (ft) Assistive Device: Gait belt;Brace/Splint;Cane, straight;Walker, rolling Ambulation - Level of Assistance: Minimal assistance Gait Abnormalities: Decreased step clearance; Antalgic Base of Support: Widened Speed/Lashay: Pace decreased (<100 feet/min) Step Length: Right shortened;Left shortened Stairs: 
  
  
   
 
Neuro Re-Education: 
 
Therapeutic Exercises:  
 
Pain: 
Pain Scale 1: Numeric (0 - 10) Pain Intensity 1: 5 Pain Location 1: Back Pain Orientation 1: Posterior Pain Description 1: Aching Pain Intervention(s) 1: Medication (see MAR) Activity Tolerance:  
Good. vss Please refer to the flowsheet for vital signs taken during this treatment. After treatment:  
[x]    Patient left in no apparent distress sitting up in chair 
[]    Patient left in no apparent distress in bed 
[x]    Call bell left within reach [x]    Nursing notified 
[x]    Caregiver present 
[]    Bed alarm activated COMMUNICATION/COLLABORATION:  
 The patients plan of care was discussed with: Occupational Therapist and Registered Nurse Jun Ramos, PT, DPT Time Calculation: 19 mins

## 2018-11-29 NOTE — PROGRESS NOTES
Care Management Interventions PCP Verified by CM: Yes 
Palliative Care Criteria Met (RRAT>21 & CHF Dx)?: No 
Mode of Transport at Discharge: Other (see comment)(Patient's  will discharge) Transition of Care Consult (CM Consult): Home Health [address]: No 
Reason Outside [place]: Physician referred to specific agency [facility]: No 
Discharge Durable Medical Equipment: No 
Health Maintenance Reviewed: Yes Physical Therapy Consult: Yes Occupational Therapy Consult: Yes Speech Therapy Consult: No 
Current Support Network: Lives with Spouse, Own Home Confirm Follow Up Transport: Family Plan discussed with Pt/Family/Caregiver: Yes [facility] [employer] Information Provided?: No 
Discharge Location Discharge Placement: Home with home health Reason for Admission:   LUMBAR LAMINECTOMY REVISION L3-4 WITH L3-5 FUSION/L3-4 OSTEOTOMY RRAT Score:          5 Plan for utilizing home health:      Patient stated an RN and therapy has come to her house before, she was unknown of the agency name. At [facility] referral was sent, and they can accept. Likelihood of Readmission:  low Transition of Care Plan:  CM reviewed chart for transitions of care, and discussed in rounds. Cm met with patient at bedside to explain role and offer support. Patient is alert and oriented x4, lives with spouse. Discharge planned for tomorrow with At [facility] follow-up.  
 
King Collet, [facility]

## 2018-11-29 NOTE — PROGRESS NOTES
Problem: Self Care Deficits Care Plan (Adult) Goal: *Acute Goals and Plan of Care (Insert Text) Occupational Therapy Goals Initiated 11/28/2018 1. Patient will perform lower body dressing with modified independence using AE PRN within 7 days. 2.  Patient will perform toilet transfer with modified independence using most appropriate DME within 7 days. 3.  Patient will grooming at the sink with modified independence within 7 days. 4.  Patient will don/doff back brace at modified independence within 7 days. 5.  Patient will verbalize/demonstrate 3/3 back precautions during ADL tasks without cues within 7 days. Occupational Therapy TREATMENT Patient: Lynda Chen (57 y.o. female) Date: 11/29/2018 Diagnosis: STENOSIS/SPONDYLOLYSTHEISIS Spinal stenosis, lumbar <principal problem not specified> Procedure(s) (LRB): LUMBAR LAMINECTOMY REVISION L3-4 WITH L3-5 FUSION/L3-4 OSTEOTOMY (N/A) 2 Days Post-Op Precautions: Back, Fall Chart, occupational therapy assessment, plan of care, and goals were reviewed. ASSESSMENT:  
Patient received in supine, agreeable to therapy, completing bed mobility with SBA & no bed rail use, CGA for all mobility. Mod required to don TLSO with cues for compensatory strategies, encouraged used of mirror for visual cues. Initial SPC use with ambulation, however d/t apparent hesitation & difficulty coordinating pace, provided RW with improved stability. Continues to require cues for twisting, returned to the chair with all needs met, encouraged repositioning every 30-45 minutes & mobilize with staff assist.  
 
Progression toward goals: 
[x]       Improving appropriately and progressing toward goals 
[]       Improving slowly and progressing toward goals 
[]       Not making progress toward goals and plan of care will be adjusted PLAN: 
Patient continues to benefit from skilled intervention to address the above impairments. Continue treatment per established plan of care. Discharge Recommendations:  Home Health Further Equipment Recommendations for Discharge:  No needs (has all DME/AE needed) SUBJECTIVE:  
Patient stated Oh I feel like I'm Rogene Leyden now!  re: after provided RW OBJECTIVE DATA SUMMARY:  
Cognitive/Behavioral Status: 
Neurologic State: Alert Orientation Level: Oriented X4 Cognition: Appropriate for age attention/concentration; Appropriate decision making; Appropriate safety awareness; Follows commands Perception: Appears intact Perseveration: No perseveration noted Safety/Judgement: Awareness of environment; Fall prevention Functional Mobility and Transfers for ADLs:Bed Mobility: 
Supine to Sit: Stand-by assistance Sit to Supine: (up in chair at session end) Scooting: Supervision Transfers: 
Sit to Stand: Stand-by assistance Balance: 
Sitting: Intact Standing: Impaired Standing - Static: Good Standing - Dynamic : Fair ADL Intervention: 
  
Upper Body Dressing Assistance Orthotics(Brace): Moderate assistance; Compensatory technique training(TLSO) Cues: Verbal cues provided;Visual cues provided;Physical assistance Cognitive Retraining Safety/Judgement: Awareness of environment; Fall prevention Therapeutic Exercises:  
Ambulation in room & hallway Pain: 
Pain Scale 1: Numeric (0 - 10) Pain Intensity 1: 5 Pain Location 1: Back Pain Orientation 1: Posterior Pain Description 1: Aching Pain Intervention(s) 1: Medication (see MAR) Activity Tolerance:  
Good Please refer to the flowsheet for vital signs taken during this treatment. After treatment:  
[x] Patient left in no apparent distress sitting up in chair 
[] Patient left in no apparent distress in bed 
[x] Call bell left within reach [x] Nursing notified 
[x] Caregiver present 
[] Bed alarm activated COMMUNICATION/COLLABORATION:  
The patients plan of care was discussed with: Physical Therapist and Registered Nurse Rodolfo Lanes, OT Time Calculation: 27 mins

## 2018-11-29 NOTE — PROGRESS NOTES
Orthopedic Spine Progress Note Post Op day: 2 Days Post-Op 2018 8:05 AM  
Admit Date: 2018 Procedure: Procedure(s): LUMBAR LAMINECTOMY REVISION L3-4 WITH L3-5 FUSION/L3-4 OSTEOTOMY Subjective:  
 
Sindye Battles doing well. No complaints. Low back pain controlled. Expected residual bilateral numbness and tingling present since initial fusion. Tolerating diet. No N/V. VSS/Afebrile. Pain Control:  
Pain Assessment Pain Scale 1: Numeric (0 - 10) Pain Intensity 1: 8 Pain Onset 1: post op Pain Location 1: Back Pain Orientation 1: Lower Pain Description 1: Aching Pain Intervention(s) 1: Medication (see MAR) Objective:  
 
 
  
Physical Exam: 
General:  Alert and oriented. No acute distress. Heart:  Respirations unlabored. Abdomen:  
Extremities: Soft, non-tender. No evidence of cyanosis. Pulses palpable in both upper and lower extremities. Neurologic: 
Musculoskeletal:  No new motor deficits. Neurovascular exam within normal limits. Sensation stable. Motor: unchanged C5-T1 and L2-S1. Boaz's sign negative in bilateral lower extremities. Calves soft, nontender upon palpation and with passive twitch. Moves both upper and lower extremities. Incision: clean, dry, and intact. No significant erythema or swelling. No active drainage noted. Vital Signs:   
Blood pressure 116/70, pulse 94, temperature 99.5 °F (37.5 °C), resp. rate 16, height 5' 4\" (1.626 m), weight 84.8 kg (187 lb), SpO2 91 %. Temp (24hrs), Av.2 °F (37.3 °C), Min:98.3 °F (36.8 °C), Max:100.2 °F (37.9 °C) LAB:   
Recent Labs  
  18 
0510 HGB 9.7* Lab Results Component Value Date/Time  Sodium 139 2018 05:10 AM  
 Potassium 3.8 2018 05:10 AM  
 Chloride 107 2018 05:10 AM  
 CO2 25 2018 05:10 AM  
 Glucose 92 2018 05:10 AM  
 BUN 6 2018 05:10 AM  
 Creatinine 0.54 (L) 2018 05:10 AM  
 Calcium 7.9 (L) 2018 05:10 AM  
 
 
 Intake/Output:No intake/output data recorded. 11/27 1901 - 11/29 0700 In: -  
Out: 9449 [Urine:4900; Drains:220] PT/OT:  
Gait:  Gait Base of Support: Widened Speed/Lashay: Pace decreased (<100 feet/min) Step Length: Left shortened, Right shortened Gait Abnormalities: Antalgic, Decreased step clearance Ambulation - Level of Assistance: Minimal assistance, Assist x1 Distance (ft): 55 Feet (ft) Assistive Device: Brace/Splint, Gait belt Assessment:  
Patient is 2 Days Post-Op s/p Procedure(s): LUMBAR LAMINECTOMY REVISION L3-4 WITH L3-5 FUSION/L3-4 OSTEOTOMY Plan: 1. Continue PT/OT - TLSO brace at bedside 2. Continue established methods of pain control 3. VTE Prophylaxes - TEDS &/or SCDs 4. Encouraged ICS 5. Remove drain 6. Discharge home with home health likely tomorrow Signed By: OMAR Luo

## 2018-11-29 NOTE — PROGRESS NOTES
Bedside shift change report given to Kayley Bridges RN (oncoming nurse) by Angela Morgan RN (offgoing nurse). Report included the following information SBAR, Kardex, Procedure Summary, Intake/Output, MAR and Recent Results.

## 2018-11-29 NOTE — PROGRESS NOTES
Problem: Mobility Impaired (Adult and Pediatric) Goal: *Acute Goals and Plan of Care (Insert Text) Physical Therapy Goals Initiated 11/28/2018 1. Patient will move from supine to sit and sit to supine  and roll side to side in bed with independence within 4 days. 2. Patient will perform sit to stand with independence within 4 days. 3. Patient will ambulate with independence for 150 feet with the least restrictive device within 4 days. 4. Patient will ascend/descend 13 stairs with 1 handrail(s) with modified independence within 4 days. 5. Patient will verbalize and demonstrate understanding of spinal precautions (No bending, lifting greater than 5 lbs, or twisting; log-roll technique; frequent repositioning as instructed) within 4 days. physical Therapy TREATMENT Patient: Chrissy Wallace (54 y.o. female) Date: 11/29/2018 Diagnosis: STENOSIS/SPONDYLOLYSTHEISIS Spinal stenosis, lumbar <principal problem not specified> Procedure(s) (LRB): LUMBAR LAMINECTOMY REVISION L3-4 WITH L3-5 FUSION/L3-4 OSTEOTOMY (N/A) 2 Days Post-Op Precautions: Back, Fall Chart, physical therapy assessment, plan of care and goals were reviewed. ASSESSMENT: 
Pt received supine in bed and agreeable to therapy. Pt able to recall spinal precautions without cues. Pt tolerated session well and making progress towards goals. Pt completed supine to sit with standby assist and able to maintain log roll technique without difficulty. Pt required mod A to don TLSO while seated EOB. Pt completed sit<>stand without AD with CGA. Pt ambulated increased gait distance today with min A and HHA x 1. Pt with lateral trunk sway, minor path deviations, inconsistent step length. Pt completed transfer on and off the toilet and pericare independently. Pt remained seated in chair with all needs met.  Pt will continue to benefit from PT to progress mobility as tolerated and reach highest level of independence. Pt anticipates she will go home tomorrow. Recommend home with HHPT. Progression toward goals: 
[x]      Improving appropriately and progressing toward goals 
[]      Improving slowly and progressing toward goals 
[]      Not making progress toward goals and plan of care will be adjusted PLAN: 
Patient continues to benefit from skilled intervention to address the above impairments. Continue treatment per established plan of care. Discharge Recommendations:  Home Health Further Equipment Recommendations for Discharge:  none SUBJECTIVE:  
Patient stated I am feeling pretty good. Still sore, but I am okay.  The patient stated 3/3 back precautions. Reviewed all 3 with patient. OBJECTIVE DATA SUMMARY:  
Critical Behavior: 
Neurologic State: Alert Orientation Level: Oriented X4 Cognition: Follows commands Safety/Judgement: Awareness of environment, Fall prevention Functional Mobility Training: 
Bed Mobility: 
Log   
Supine to Sit: Stand-by assistance Brace donned with  moderate assistance  Seated EOB Transfers: 
Sit to Stand: Contact guard assistance Stand to Sit: Contact guard assistance Balance: 
Sitting: Intact Standing: Impaired Standing - Static: Good Standing - Dynamic : FairAmbulation/Gait Training: 
Distance (ft): 80 Feet (ft) Assistive Device: Gait belt;Brace/Splint(HHA x 1) Ambulation - Level of Assistance: Minimal assistance Gait Abnormalities: Decreased step clearance; Antalgic Base of Support: Widened Speed/Lashay: Pace decreased (<100 feet/min) Step Length: Right shortened;Left shortened Pain: 
Pain Scale 1: Numeric (0 - 10) Pain Intensity 1: 4 Pain Location 1: Back Pain Orientation 1: Lower Pain Description 1: Aching Pain Intervention(s) 1: Medication (see MAR) Activity Tolerance:  
Good. vss Please refer to the flowsheet for vital signs taken during this treatment. After treatment: [x]  Patient left in no apparent distress sitting up in chair 
[]  Patient left in no apparent distress in bed 
[x]  Call bell left within reach [x]  Nursing notified 
[]  Caregiver present 
[]  Bed alarm activated COMMUNICATION/COLLABORATION:  
The patients plan of care was discussed with: Occupational Therapist and Registered Nurse Geetha De Paz, PT, DPT Time Calculation: 17 mins

## 2018-11-30 VITALS
DIASTOLIC BLOOD PRESSURE: 64 MMHG | BODY MASS INDEX: 31.92 KG/M2 | RESPIRATION RATE: 16 BRPM | HEIGHT: 64 IN | TEMPERATURE: 98.5 F | HEART RATE: 82 BPM | OXYGEN SATURATION: 95 % | SYSTOLIC BLOOD PRESSURE: 104 MMHG | WEIGHT: 187 LBS

## 2018-11-30 PROCEDURE — 97116 GAIT TRAINING THERAPY: CPT

## 2018-11-30 PROCEDURE — 74011250637 HC RX REV CODE- 250/637: Performed by: PHYSICIAN ASSISTANT

## 2018-11-30 RX ADMIN — DICYCLOMINE HYDROCHLORIDE 10 MG: 10 CAPSULE ORAL at 09:28

## 2018-11-30 RX ADMIN — PSYLLIUM HUSK 1 PACKET: 3.4 POWDER ORAL at 09:29

## 2018-11-30 RX ADMIN — CITALOPRAM HYDROBROMIDE 20 MG: 20 TABLET ORAL at 09:29

## 2018-11-30 RX ADMIN — ACETAMINOPHEN 1000 MG: 500 TABLET ORAL at 03:53

## 2018-11-30 RX ADMIN — Medication 10 ML: at 08:16

## 2018-11-30 RX ADMIN — LEVOTHYROXINE SODIUM 50 MCG: 50 TABLET ORAL at 08:15

## 2018-11-30 RX ADMIN — OXYCODONE HYDROCHLORIDE 10 MG: 5 TABLET ORAL at 11:49

## 2018-11-30 RX ADMIN — PANTOPRAZOLE SODIUM 40 MG: 40 TABLET, DELAYED RELEASE ORAL at 08:15

## 2018-11-30 RX ADMIN — OXYCODONE HYDROCHLORIDE 10 MG: 5 TABLET ORAL at 00:12

## 2018-11-30 RX ADMIN — OXYCODONE HYDROCHLORIDE 10 MG: 5 TABLET ORAL at 03:53

## 2018-11-30 RX ADMIN — OXYCODONE HYDROCHLORIDE 10 MG: 5 TABLET ORAL at 08:18

## 2018-11-30 RX ADMIN — ACETAMINOPHEN 1000 MG: 500 TABLET ORAL at 09:29

## 2018-11-30 RX ADMIN — OXYCODONE HYDROCHLORIDE 10 MG: 5 TABLET ORAL at 15:07

## 2018-11-30 RX ADMIN — ACETAMINOPHEN 1000 MG: 500 TABLET ORAL at 15:07

## 2018-11-30 RX ADMIN — SENNOSIDES AND DOCUSATE SODIUM 1 TABLET: 8.6; 5 TABLET ORAL at 09:29

## 2018-11-30 NOTE — PROGRESS NOTES
Orthopedic Spine Progress Note Post Op day: 3 Days Post-Op 2018 8:17 AM  
Admit Date: 2018 Procedure: Procedure(s): LUMBAR LAMINECTOMY REVISION L3-4 WITH L3-5 FUSION/L3-4 OSTEOTOMY Subjective:  
 
Negra Montiel appears well. Pain seems to be managed. Afebrile/VSS Tolerating diet No N/V 
Voiding Pain Control:  
Pain Assessment Pain Scale 1: Visual 
Pain Intensity 1: 5 Pain Onset 1: postop Pain Location 1: Back Pain Orientation 1: Lower Pain Description 1: Aching Pain Intervention(s) 1: Medication (see MAR) Objective:  
 
 
  
Physical Exam: 
General:  Alert and oriented. No acute distress. Heart:  Respirations unlabored. Abdomen:  
Extremities: Soft, non-tender. No evidence of cyanosis. Pulses palpable in both upper and lower extremities. Neurologic: 
Musculoskeletal:  No new motor deficits. Neurovascular exam within normal limits. Sensation stable. Motor: unchanged C5-T1 and L2-S1. Boaz's sign negative in bilateral lower extremities. Calves soft, nontender upon palpation and with passive twitch. Moves both upper and lower extremities. Incision: clean, dry, and intact. No significant erythema or swelling. No active drainage noted. Vital Signs:   
Blood pressure 127/76, pulse 97, temperature 99.7 °F (37.6 °C), resp. rate 14, height 5' 4\" (1.626 m), weight 84.8 kg (187 lb), SpO2 92 %. Temp (24hrs), Av.4 °F (37.4 °C), Min:98.4 °F (36.9 °C), Max:100.5 °F (38.1 °C) LAB:   
Recent Labs  
  18 
0510 HGB 9.7* Lab Results Component Value Date/Time Sodium 139 2018 05:10 AM  
 Potassium 3.8 2018 05:10 AM  
 Chloride 107 2018 05:10 AM  
 CO2 25 2018 05:10 AM  
 Glucose 92 2018 05:10 AM  
 BUN 6 2018 05:10 AM  
 Creatinine 0.54 (L) 2018 05:10 AM  
 Calcium 7.9 (L) 2018 05:10 AM  
 
 
Intake/Output:No intake/output data recorded. 1901 - 700 In: -  
Out: 25 [Drains:25] PT/OT:  
Gait:  Gait Base of Support: Widened Speed/Lashay: Pace decreased (<100 feet/min) Step Length: Right shortened, Left shortened Gait Abnormalities: Decreased step clearance, Antalgic Ambulation - Level of Assistance: Minimal assistance Distance (ft): 85 Feet (ft) Assistive Device: Gait belt, Brace/Splint, Cane, straight, Walker, rolling Assessment:  
Patient is 3 Days Post-Op s/p Procedure(s): LUMBAR LAMINECTOMY REVISION L3-4 WITH L3-5 FUSION/L3-4 OSTEOTOMY Plan: 1. Continue PT/OT 2. Continue established methods of pain control 3. VTE Prophylaxes - TEDS & SCDs 4. Encouraged use of ICS 5. Discharge to home today with home health Signed By: Rogelio BurksnsJOCELIN galvin

## 2018-11-30 NOTE — PROGRESS NOTES
Problem: Mobility Impaired (Adult and Pediatric) Goal: *Acute Goals and Plan of Care (Insert Text) Physical Therapy Goals Initiated 11/28/2018 1. Patient will move from supine to sit and sit to supine  and roll side to side in bed with independence within 4 days. 2. Patient will perform sit to stand with independence within 4 days. 3. Patient will ambulate with independence for 150 feet with the least restrictive device within 4 days. 4. Patient will ascend/descend 13 stairs with 1 handrail(s) with modified independence within 4 days. 5. Patient will verbalize and demonstrate understanding of spinal precautions (No bending, lifting greater than 5 lbs, or twisting; log-roll technique; frequent repositioning as instructed) within 4 days. physical Therapy TREATMENT Patient: Vandana Mejia (39 y.o. female) Date: 11/30/2018 Diagnosis: STENOSIS/SPONDYLOLYSTHEISIS Spinal stenosis, lumbar <principal problem not specified> Procedure(s) (LRB): LUMBAR LAMINECTOMY REVISION L3-4 WITH L3-5 FUSION/L3-4 OSTEOTOMY (N/A) 3 Days Post-Op Precautions: Back, Fall Chart, physical therapy assessment, plan of care and goals were reviewed. ASSESSMENT: 
Pt received ambulating into the bathroom with nursing assistance and agreeable to PT intervention. Pt cleared by nursing for mobility. Pt with good progress towards therapy goals this date. Reported 6/10 pain with initial mobility. Able to recall 3/3 back precautions, however needed min cueing x 1 for adherence during session. RN reporting that patient with decrease in O2 sats this AM, requiring supplemental O2, however pt with Raynaud's Syndrome and unreliable SaO2 reading. Pt without c/o SOB during all mobility. Needed min A for straps to luis felipe TLSO. She ambulated 180 ft with CGA/SBA and RW support, demonstrating slow gait speed and widened DIEGO throughout.  Ascended/descended 12 steps with R railing, CGA, and VCs for proper sequencing. Reported 6/10 pain with gait, however noted 8/10 pain while ascending steps. Pt with increased activity and improved activity tolerance overall this date. She was assisted into bedside chair and left with all needs met, RN aware, and NP and caregiver present following session. Pt reporting that she will have someone with her 24/7 at discharge. Pt is cleared to discharge home with HHPT, use of RW, and caregiver assistance once medically stable. Progression toward goals: 
[x]      Improving appropriately and progressing toward goals 
[]      Improving slowly and progressing toward goals 
[]      Not making progress toward goals and plan of care will be adjusted PLAN: 
Patient continues to benefit from skilled intervention to address the above impairments. Continue treatment per established plan of care. Discharge Recommendations:  Home Health Further Equipment Recommendations for Discharge:  RW - Ordered and in patient's possession SUBJECTIVE:  
Patient stated I don't want to forget to breathe.  The patient stated 3/3 back precautions. Reviewed all 3 with patient. OBJECTIVE DATA SUMMARY:  
Critical Behavior: 
Neurologic State: Alert Orientation Level: Oriented X4 Cognition: Appropriate safety awareness, Follows commands Safety/Judgement: Awareness of environment, Fall prevention Functional Mobility Training: 
Bed Mobility: 
Log   
  
  
Scooting: Supervision Brace donned with  minimal assistance/contact guard assist x 1 Transfers: 
Sit to Stand: Supervision Stand to Sit: Supervision Bed to Chair: Stand-by assistance Balance: 
Sitting: Intact Standing: Intact; With support Standing - Static: Good;Constant support Standing - Dynamic : Good(with RW support)Ambulation/Gait Training: 
Distance (ft): 180 Feet (ft) Assistive Device: Gait belt;Brace/Splint; Walker, rolling Ambulation - Level of Assistance: Contact guard assistance;Stand-by assistance; Additional time; Adaptive equipment Gait Abnormalities: Decreased step clearance Base of Support: Widened Speed/Lashay: Pace decreased (<100 feet/min) Step Length: Left shortened;Right shortened Stairs: 
Number of Stairs Trained: 12 
Stairs - Level of Assistance: Contact guard assistance; Additional time Rail Use: Right Pain: 
Pain Scale 1: Numeric (0 - 10) Pain Intensity 1: 4 Pain Location 1: Back Pain Orientation 1: Lower Pain Description 1: Aching Pain Intervention(s) 1: Medication (see MAR) Activity Tolerance:  
Improving - increased activity/gait distance; pain 6-8/10; no c/o SOB with activity Please refer to the flowsheet for vital signs taken during this treatment. After treatment:  
[x]  Patient left in no apparent distress sitting up in chair 
[]  Patient left in no apparent distress in bed 
[x]  Call bell left within reach [x]  Nursing notified 
[x]  Caregiver present 
[]  Bed alarm activated COMMUNICATION/COLLABORATION:  
The patients plan of care was discussed with: Physical Therapist and Registered Nurse Harvey Celestin PT, DPT Time Calculation: 35 mins

## 2018-11-30 NOTE — PROGRESS NOTES
Cm notified [facility] of patient discharge today.  
 
Elisha Martin [facility]

## 2018-11-30 NOTE — DISCHARGE INSTRUCTIONS
After Hospital Care Plan:  Discharge Instructions Lumbar Fusion Surgery   Dr. Miles Clark   Patient Name: Angely Brower    Date of procedure: 11/27/2018  Date of discharge:     Procedure: Procedure(s):  LUMBAR LAMINECTOMY REVISION L3-4 WITH L3-5 FUSION/L3-4 OSTEOTOMY  PCP: Santo Farris MD    Follow up appointments  -follow up with Dr. Dr. Miles Clark in 2 weeks. Call [phone] to make an appointment as soon as you get home from the hospital.    [address] [place] [address]: ____________________   phone: _______________________  The agency will contact you to arrange dates/times for visits. Please call them if you do not hear from them within 24 hours after you are discharged  Physical therapy 3 times a week for 3 weeks  Nursing-initial assessment and as needed    When to call your Orthopaedic Surgeon:  -Signs of infection-if your incision is red; continues to have drainage; drainage has a foul odor or if you have a persistent fever over 101 degrees for 24 hours  -Nausea or vomiting, severe headache  -Loss of bowel or bladder function, inability to urinate  -Changes in sensation in your arms or legs (numbness, tingling, loss of color)  -Increased weakness-greater than before your surgery  -Severe pain or pain not relieved by medications  -Signs of a blood clot in your leg-calf pain, tenderness, redness, swelling of lower leg    When to call your Primary Care Physician:  -Concerns about medical conditions such as diabetes, high blood pressure, asthma, congestive heart failure  -Call if blood sugars are elevated, persistent headache or dizziness, coughing or congestion, constipation or diarrhea, burning with urination, abnormal heart rate    When to call 911 and go to the nearest emergency room:  -Acute onset of chest pain, shortness of breath, difficulty breathing    Activity  -You are going home a well person, be as active as possible. Your only exercise should be walking.   Start with short frequent walks and increase your walking distance each day.  -Limit the amount of time you sit to 20-30 minute intervals. Sitting for prolonged periods of time will be uncomfortable for you following surgery.  -Do NOT lift anything over 5 pounds  -Do NOT do any straining, twisting or bending  -When you are in bed, you may lay on your back or on either side. Do NOT lie on your stomach    Brace  -If you have a back brace, you should wear your brace at all times when you are out of bed. Do not wear the brace while in bed or showering.  -Remember to always wear a cotton t-shirt underneath your brace.  -Do not bend or twist when your brace is off    Diet  -Resume usual diet; drink plenty of fluids; eat foods high in fiber  -It is important to have regular bowel movements. Pain medications may cause constipation. You may want to take a stool softener (such as Senokot-S or Colace) to prevent constipation.   -If constipation occurs, take a laxative (such as Dulcolax tablets, Milk of Magnesia, or a suppository). Laxatives should only be used if the above preventable measures have failed and you still have not had a bowel movement after three days    Driving  -You may not drive or return to work until instructed by your physician. However, you may ride in the car for short periods of time. Incision Care  Your incision has been closed with absorbable sutures and the Zipline skin closure system. This will assist with healing. The Jon Arbour is to remain on your incision for 2 weeks. A dry dressing (ABD and tape) will be placed over it and should be changed daily, for at least the first several days after your surgery. If you have no incisional drainage, you may leave the incision open to air if you wish, still leaving the Zipline in place. Please make sure to wash your hands prior to touching your dressing. You may take brief showers but do not run the water directly onto the wound.  After your shower, blot your incision dry with a soft towel and replace the dry dressing. Do not allow the tape to come in contact with the Zipline. Do not rub or apply any lotions or ointments to your incision site. Do not soak or scrub your wound. The Zipline dressing will be removed during your two week follow-up appointment. If you experience drainage leaking from underneath the Zipline or if it peels off before 2 weeks, please contact your orthopedic surgeons office. Showering  -You may shower in approximately 4 days after your surgery.    -Leave the dressing on during your shower. Do NOT allow the water to run directly onto your dressing. Once you get out of the shower, gently pat the dressing dry. -Reminder- your brace can be removed while showering. Remember to not bend or twist while your brace is off.    -Do not take a tub bath. Preventing blood clots  -You have been given T.E.D. stockings to wear. Continue to wear these for 7 days after your discharge. Put them on in the morning and take them off at night.    -They are used to increase your circulation and prevent blood clots from forming in your legs  -T. E.D. stockings can be machine washed, temperature not to exceed 160° F (71°C) and machine dried for 15 to 20 minutes, temperature not to exceed 250° F (121°C). Pain management  -Take pain medication as prescribed; decrease the amount you use as your pain lessens  -DO not wait until you are in extreme pain to take your medication.  -Avoid alcoholic beverages while taking pain medication    Pain Medication Safety  DO:  -Read the Medication Guide   -Take your medicine exactly as prescribed   -Store your medicine away from children and in a safe place   -Flush unused medicine down the toilet   -Call your healthcare provider for medical advice about side effects. You may report side effects to FDA at 9-144-FDA-7673.   -Please be aware that many medications contain Tylenol.   We do not want you to over medicate so please read the information below as a guide. Do not take more than 4 Grams of Tylenol in a 24 hour period. (There are 1000 milligrams in one Gram)                                                                                                                                                                                                                                                Percocet contains 325 mg of Tylenol per tablet (do not take more than 12 tablets in 24 hours)  Lortab contains 500 mg of Tylenol per tablet (do not take more than 8 tablets in 24 hours)  Norco contains 325 mg of Tylenol per tablet (do not take more than 12 tablets in 24 hours). DO NOT:  -Do not give your medicine to others   -Do not take medicine unless it was prescribed for you   -Do not stop taking your medicine without talking to your healthcare provider   -Do not break, chew, crush, dissolve, or inject your medicine. If you cannot swallow your medicine whole, talk to your healthcare provider.  -Do not drink alcohol while taking this medicine  -Do not take anti-inflammatory medications or aspirin unless instructed by your     physician. **hold your Bentyl until your constipation resolves. Take miralax twice daily when you return home.  Use a suppository or enema for severe constipation

## 2018-12-06 NOTE — DISCHARGE SUMMARY
Procedure(s):  LUMBAR LAMINECTOMY REVISION L3-4 WITH L3-5 FUSION/L3-4 OSTEOTOMY Operative Report      Date of Surgery: 11/27/2018     Preoperative Diagnosis:  STENOSIS/SPONDYLOLYSTHEISIS    Postoperative Diagnosis: STENOSIS/SPONDYLOLYSTHEISIS    Procedure: Procedure(s):  LUMBAR LAMINECTOMY REVISION L3-4 WITH L3-5 FUSION/L3-4 OSTEOTOMY     Surgeon: Lizette Meckel, MD    History and Hospital Course:  Renetta Romero is a pleasant 70y.o. year old female who has complaints of low back pain. Diagnostic testing found mild flat back deformity with a pelvic incidence T lumbar lordosis mismatch. Having failed conservative treatment, the patient elected to undergo operative intervention. She tolerated the procedure well and was admitted post-operatively for antibiotics and pain control. On post-op day 1, the patient was doing well. She had some complaints of lower back pain but managed. She was started on a regular diet. The PCA was discontinued and the patient was started on oral pain medications. She was allowed to started getting out of bed with physical therapy. IV antibiotics were continued. On post-op day 2, the patient continued to progress well. Pain controlled. Drain removed. Continued to work well with PT/OT. On post-op day 3, the patient was deemed ready for discharge. She was discharged to home with home health. She was tolerating a regular diet and pain was well controlled with oral pain medications. The patient was discharged with prescriptions for pain medications. She was instructed to wear her brace at all times when out of bed. She was instructed to limit her bending, lifting and twisting. The patient will follow-up in 10-14 days for repeat x-rays and a wound check.       Signed By: Lizette Meckel, MD

## 2021-12-20 DIAGNOSIS — Z78.9 CONDITION INFLUENCING HEALTH STATUS: ICD-10-CM

## 2021-12-21 RX ORDER — DICLOFENAC SODIUM 75 MG/1
TABLET, DELAYED RELEASE ORAL
Qty: 180 TABLET | Refills: 0 | Status: SHIPPED | OUTPATIENT
Start: 2021-12-21 | End: 2022-03-15

## 2022-03-15 DIAGNOSIS — Z78.9 CONDITION INFLUENCING HEALTH STATUS: ICD-10-CM

## 2022-03-15 RX ORDER — DICLOFENAC SODIUM 75 MG/1
TABLET, DELAYED RELEASE ORAL
Qty: 180 TABLET | Refills: 0 | Status: SHIPPED | OUTPATIENT
Start: 2022-03-15 | End: 2022-06-14

## 2022-03-19 PROBLEM — Z22.322 MRSA CARRIER: Status: ACTIVE | Noted: 2018-11-19

## 2022-03-19 PROBLEM — M48.061 SPINAL STENOSIS, LUMBAR: Status: ACTIVE | Noted: 2018-11-27

## 2022-06-11 DIAGNOSIS — Z78.9 CONDITION INFLUENCING HEALTH STATUS: ICD-10-CM

## 2022-06-14 RX ORDER — DICLOFENAC SODIUM 75 MG/1
TABLET, DELAYED RELEASE ORAL
Qty: 180 TABLET | Refills: 0 | Status: SHIPPED | OUTPATIENT
Start: 2022-06-14

## 2022-11-21 DIAGNOSIS — Z78.9 CONDITION INFLUENCING HEALTH STATUS: ICD-10-CM

## 2022-11-21 RX ORDER — DICLOFENAC SODIUM 75 MG/1
TABLET, DELAYED RELEASE ORAL
Qty: 180 TABLET | Refills: 0 | Status: SHIPPED | OUTPATIENT
Start: 2022-11-21

## 2023-04-13 ENCOUNTER — OFFICE VISIT (OUTPATIENT)
Dept: ORTHOPEDIC SURGERY | Age: 68
End: 2023-04-13

## 2023-04-13 VITALS — WEIGHT: 170 LBS | BODY MASS INDEX: 30.12 KG/M2 | HEIGHT: 63 IN

## 2023-04-13 DIAGNOSIS — Z78.9 CONDITION INFLUENCING HEALTH STATUS: ICD-10-CM

## 2023-04-13 DIAGNOSIS — Z98.1 S/P LUMBAR SPINAL FUSION: Primary | ICD-10-CM

## 2023-04-13 RX ORDER — PANTOPRAZOLE SODIUM 40 MG/1
TABLET, DELAYED RELEASE ORAL
COMMUNITY
Start: 2023-02-19

## 2023-04-13 RX ORDER — ROSUVASTATIN CALCIUM 10 MG/1
10 TABLET, COATED ORAL
COMMUNITY
Start: 2023-01-26 | End: 2023-04-13

## 2023-04-13 RX ORDER — GABAPENTIN 600 MG/1
600 TABLET ORAL
COMMUNITY
Start: 2023-01-22 | End: 2023-04-13 | Stop reason: SDUPTHER

## 2023-04-13 RX ORDER — DICLOFENAC SODIUM 75 MG/1
75 TABLET, DELAYED RELEASE ORAL 2 TIMES DAILY
Qty: 180 TABLET | Refills: 1 | Status: SHIPPED | OUTPATIENT
Start: 2023-04-13

## 2023-04-13 RX ORDER — GABAPENTIN 600 MG/1
600 TABLET ORAL
Qty: 90 TABLET | Refills: 1 | Status: SHIPPED | OUTPATIENT
Start: 2023-04-13

## 2023-04-17 NOTE — PROGRESS NOTES
Marie Lombardi (: 1955) is a 69 y.o. female patient here for evaluation of the following chief complaint(s):  Back Pain (Revision Laminectomy and Posterior Revision Fusion L3/5 2018)         ASSESSMENT/PLAN:  Below is the assessment and plan developed based on review of pertinent history, physical exam, labs, studies, and medications. 1. S/P lumbar spinal fusion  -     XR SPINE LUMB MIN 4 V; Future  -     gabapentin (NEURONTIN) 600 mg tablet; Take 1 Tablet by mouth nightly. Max Daily Amount: 600 mg., Normal, Disp-90 Tablet, R-1Supervising Physician: Eloise Nowak MD NPI: [id number] LUCY: [id number]  2. Condition influencing health status  -     diclofenac EC (VOLTAREN) 75 mg EC tablet; Take 1 Tablet by mouth two (2) times a day., Normal, Disp-180 Tablet, R-1      The patient's radiologic findings have been reviewed with her in detail today. She has a longstanding history of chronic low back pain with right buttock pain that is worsening with recent medication changes. She has chronic left lower extremity numbness and tingling post her 2018 lumbar fusion. She would like to continue with conservative measures, and would like for us to take back over her gabapentin and increase her diclofenac back to 75 mg twice daily. She has had recent blood work performed. She has declined any further treatment at this time, and I have encouraged her to increase home exercises and core strengthening. We will see her back for follow-up visit in 6 months, or sooner as needed. Return in about 6 months (around 10/13/2023) for Medication management. SUBJECTIVE/OBJECTIVE:  Marie Lombardi (: 1955) is a 69 y.o. female who presents today for the following:  Chief Complaint   Patient presents with    Back Pain     Revision Laminectomy and Posterior Revision Fusion L3/5 2018        Back Pain   Associated symptoms include numbness. Ms. Stephanie Reed returns today as a previous patient to the practice.   She is status post prior lumbar fusion per Dr. Arlene Barber from 2018. She states that she has chronic left lower extremity numbness and tingling that remains stable and unchanged. She has had persistent and progressively worsening low back pain with right buttock discomfort that is particularly bothersome with prolonged standing. She denies any numbness, tingling, or weakness in the right leg. No bowel or bladder changes. She has done well with gabapentin 600 mg at bedtime and diclofenac 75 mg twice daily. Her primary care provider has been managing this for her, and she has felt stable until recently when her diclofenac prescription was inadvertently decreased back to 50 mg. She is interested in having us refill and take over these medications for her moving forward. She has had recent blood work by her PCP. IMAGING:  XR Results (most recent):  Results from Appointment encounter on 04/13/23    XR SPINE LUMB MIN 4 V    Narrative  AP, lateral, flexion and extension films of the lumbar spine reveal a stable posterior lumbar fusion from L3-5. There is significant junctional stenosis above the fusion at L1-2 and L2-3 and moderate junctional stenosis below the fusion at L5-S1. MRI Results (most recent):  No results found for this or any previous visit. Allergies   Allergen Reactions    Celebrex [Celecoxib] Nausea and Vomiting    Lipitor [Atorvastatin] Other (comments)     Memory loss. Current Outpatient Medications   Medication Sig    pantoprazole (PROTONIX) 40 mg tablet TAKE 1 TABLET BY MOUTH EVERY DAY WITH OR WITHOUT FOOD    gabapentin (NEURONTIN) 600 mg tablet Take 1 Tablet by mouth nightly. Max Daily Amount: 600 mg.    diclofenac EC (VOLTAREN) 75 mg EC tablet Take 1 Tablet by mouth two (2) times a day. citalopram (CELEXA) 20 mg tablet Take 1 Tablet by mouth daily. dicyclomine (BENTYL) 10 mg capsule Take 1 Capsule by mouth three (3) times daily.  PT USUALLY ONLY TAKES 2 TIMES PER DAY acetaminophen (TYLENOL) 500 mg tablet Take 2 Tablets by mouth every six (6) hours as needed for Pain. SYNTHROID 50 mcg tablet TAKE 1 TAB BY MOUTH DAILY (BEFORE BREAKFAST). cetirizine (ZYRTEC) 10 mg tablet Take 1 Tablet by mouth nightly. oxyCODONE IR (ROXICODONE) 5 mg immediate release tablet Take 1-2 Tabs by mouth every four (4) hours as needed. Max Daily Amount: 60 mg. (Patient not taking: Reported on 2023)    naloxone [facility]) 4 mg/actuation nasal spray Use 1 spray intranasally, then discard. Repeat with new spray every 2 min as needed for opioid overdose symptoms, alternating nostrils. (Patient not taking: Reported on 2023)    PSYLLIUM SEED/SUCROSE (METAMUCIL PO) Take  by mouth daily. (Patient not taking: Reported on 2023)     No current facility-administered medications for this visit.        Past Medical History:   Diagnosis Date    Allergic rhinitis 2009    Arthritis     Back pain 2009    Colitis     Depression 2009    GERD (gastroesophageal reflux disease)     Hypercholesterolemia     Hyperlipidemia 2009    Hypothyroidism 2009    Ill-defined condition     MRSA carrier 2018    Obesity 2009    Raynaud disease         Past Surgical History:   Procedure Laterality Date    ENDOSCOPY, COLON, DIAGNOSTIC      HX ORTHOPAEDIC      LAMINECTOMY AND FUSION OF L4-L5    HX ROTATOR CUFF REPAIR Right 2016    OR UNLISTED PROCEDURE BREAST  2-    reduction       Family History   Problem Relation Age of Onset    Cancer Mother         breast, rectal    Heart Disease Father     Asthma Father     No Known Problems Sister     Diabetes Sister         TYPE II    Asthma Sister     Emphysema Sister     Asthma Sister     Dulcy Mask Syndrome Sister     Alzheimer's Disease Sister         Social History     Tobacco Use    Smoking status: Former     Packs/day: 10.00     Types: Cigarettes     Quit date: 10/7/1995     Years since quittin.5    Smokeless tobacco: Never   Substance Use Topics    Alcohol use: Yes     Alcohol/week: 1.7 standard drinks     Types: 1 Glasses of wine, 1 Shots of liquor per week     Comment: 2 DRINKS PER WEEK        Review of Systems   Constitutional: Negative. Respiratory: Negative. Cardiovascular: Negative. Gastrointestinal: Negative. Endocrine: Negative. Genitourinary: Negative. Musculoskeletal:  Positive for back pain and myalgias. Skin: Negative. Allergic/Immunologic: Negative. Neurological:  Positive for numbness. Hematological: Negative. Psychiatric/Behavioral: Negative. All other systems reviewed and are negative. No flowsheet data found. Vitals:  Ht 5' 3\" (1.6 m)   Wt 170 lb (77.1 kg)   BMI 30.11 kg/m²    Body mass index is 30.11 kg/m². Physical Exam    Neurologic  Sensory  Light Touch - Intact - Globally. Overall Assessment of Muscle Strength and Tone reveals  Lower Extremities - Right Iliopsoas - 5/5. Left Iliopsoas - 5/5. Right Tibialis Anterior - 5/5. Left Tibialis Anterior - 5/5. Right Gastroc-Soleus - 5/5. Left Gastroc-Soleus - 5/5. Right EHL - 5/5. Left EHL - 5/5. General Assessment of Reflexes  Right Ankle - Clonus is not present. Left Ankle - Clonus is not present. Reflexes (Dermatomes)  2/2 Normal - Left Achilles (L5-S2), Left Knee (L2-4), Right Achilles (L5-S2) and Right Knee (L2-4). Musculoskeletal  Global Assessment  Examination of related systems reveals - well-developed, well-nourished, in no acute distress, alert and oriented x 3. Gait and Station - normal gait and station and normal posture. Right Lower Extremity - normal strength and tone, normal range of motion without pain and no instability, subluxation or laxity. Left Lower Extremity - normal strength and tone, normal range of motion without pain and no instability, subluxation or laxity.   Spine/Ribs/Pelvis  Cervical Spine - Examination of the cervical spine reveals - no tenderness to palpation, no pain, no swelling, edema or erythema, normal cervical spine movements and normal sensation. Thoracic (Dorsal) Spine - Examination of the thoracic spine reveals - no tenderness over thoracic vertebrae, no pain, normal sensation and normal thoracic spine movements. Lumbosacral Spine - Examination of the lumbosacral spine reveals - no known fractures or deformities. Inspection and Palpation - Tenderness - moderate. Assessment of pain reveals the following findings - The pain is characterized as - moderate. Location - pain refers to lower back bilaterally. ROJM - Trunk Extension - 15 degrees. Lumbar Spine Flexion - 35 °. Lumbosacral Spine - Functional Testing - Babinski Test negative, Prone Knee Bending Test negative, Slump Test negative, Straight Leg Raising Test negative. Dr. Gage Tan was available for immediate consult during this encounter. An electronic signature was used to authenticate this note.   -- OMAR Hicks

## (undated) DEVICE — LAMINECTOMY RICHMOND-LF: Brand: MEDLINE INDUSTRIES, INC.

## (undated) DEVICE — SOLUTION IRRIG 3000ML 0.9% SOD CHL FLX CONT 0797208] ICU MEDICAL INC]

## (undated) DEVICE — COVER,TABLE,HEAVY DUTY,60"X90",STRL: Brand: MEDLINE

## (undated) DEVICE — BONE WAX WHITE: Brand: BONE WAX WHITE

## (undated) DEVICE — KENDALL SCD EXPRESS SLEEVES, KNEE LENGTH, MEDIUM: Brand: KENDALL SCD

## (undated) DEVICE — DRAIN KT WND 10FR RND 400ML --

## (undated) DEVICE — BIPOLAR IRRIGATOR INTEGRATED TUBING AND BIPOLAR CORD SET, DISPOSABLE

## (undated) DEVICE — SUTURE ABSRB BRAID COAT UD OS-6 NO 1 27IN VCRL J535H

## (undated) DEVICE — PREP KIT PEEL PTCH POVIDONE IOD

## (undated) DEVICE — 4-PORT MANIFOLD: Brand: NEPTUNE 2

## (undated) DEVICE — TRAY CATH 16F URIN MTR LTX -- CONVERT TO ITEM 363111

## (undated) DEVICE — STERILE POLYISOPRENE POWDER-FREE SURGICAL GLOVES WITH EMOLLIENT COATING: Brand: PROTEXIS

## (undated) DEVICE — COVER,MAYO STAND,STERILE: Brand: MEDLINE

## (undated) DEVICE — PILLOW POS AD L7IN R FOAM HD REST INTUB SLOT DISP

## (undated) DEVICE — ZIP 16 SURGICAL SKIN CLOSURE DEVICE: Brand: ZIP 16 SURGICAL SKIN CLOSURE DEVICE

## (undated) DEVICE — GAUZE SPONGES,12 PLY: Brand: CURITY

## (undated) DEVICE — FLOSEAL HEMOSTATIC MATRIX, 10 ML: Brand: FLOSEAL

## (undated) DEVICE — SUTURE VCRL 2-0 L27IN ABSRB UD CP-2 L26MM 1/2 CIR REV CUT J869H

## (undated) DEVICE — SOLUTION IV 250ML 0.9% SOD CHL CLR INJ FLX BG CONT PRT CLSR

## (undated) DEVICE — INFECTION CONTROL KIT SYS

## (undated) DEVICE — DRAPE XR C ARM 41X74IN LF --

## (undated) DEVICE — TOOL 14BA50 LEGEND 14CM 5MM BA: Brand: MIDAS REX ™

## (undated) DEVICE — PREP SKN PREVAIL 40ML APPL --

## (undated) DEVICE — HANDPIECE SET WITH BONE CLEANING TIP AND SUCTION TUBE: Brand: INTERPULSE

## (undated) DEVICE — SUTURE VCRL SZ 3-0 L27IN ABSRB UD CP-2 L26MM 1/2 CIR REV J868H